# Patient Record
Sex: MALE | Race: WHITE | NOT HISPANIC OR LATINO | Employment: FULL TIME | URBAN - METROPOLITAN AREA
[De-identification: names, ages, dates, MRNs, and addresses within clinical notes are randomized per-mention and may not be internally consistent; named-entity substitution may affect disease eponyms.]

---

## 2023-02-18 ENCOUNTER — APPOINTMENT (EMERGENCY)
Dept: RADIOLOGY | Facility: HOSPITAL | Age: 48
End: 2023-02-18

## 2023-02-18 ENCOUNTER — APPOINTMENT (EMERGENCY)
Dept: CT IMAGING | Facility: HOSPITAL | Age: 48
End: 2023-02-18

## 2023-02-18 ENCOUNTER — HOSPITAL ENCOUNTER (INPATIENT)
Facility: HOSPITAL | Age: 48
LOS: 2 days | Discharge: HOME | End: 2023-02-20
Attending: EMERGENCY MEDICINE | Admitting: INTERNAL MEDICINE

## 2023-02-18 DIAGNOSIS — T40.411A ACCIDENTAL FENTANYL OVERDOSE, INITIAL ENCOUNTER (HCC): Primary | ICD-10-CM

## 2023-02-18 DIAGNOSIS — S82.892A CLOSED FRACTURE DISLOCATION OF LEFT ANKLE, INITIAL ENCOUNTER: ICD-10-CM

## 2023-02-18 DIAGNOSIS — S93.05XA DISLOCATION OF LEFT ANKLE JOINT, INITIAL ENCOUNTER: ICD-10-CM

## 2023-02-18 DIAGNOSIS — S82.445G: ICD-10-CM

## 2023-02-18 PROBLEM — G47.33 OSA (OBSTRUCTIVE SLEEP APNEA): Status: ACTIVE | Noted: 2023-02-18

## 2023-02-18 PROBLEM — T50.901A ACCIDENTAL OVERDOSE: Status: ACTIVE | Noted: 2023-02-18

## 2023-02-18 PROBLEM — S82.302A CLOSED FRACTURE OF DISTAL END OF LEFT TIBIA: Status: ACTIVE | Noted: 2023-02-18

## 2023-02-18 PROBLEM — F11.90 OPIOID USE DISORDER: Status: ACTIVE | Noted: 2023-02-18

## 2023-02-18 LAB
ANION GAP SERPL CALCULATED.3IONS-SCNC: 10 MMOL/L (ref 5–14)
BASOPHILS # BLD AUTO: 0.09 THOUSANDS/ÂΜL (ref 0–0.1)
BASOPHILS NFR BLD AUTO: 1 % (ref 0–1)
BUN SERPL-MCNC: 15 MG/DL (ref 5–25)
CALCIUM SERPL-MCNC: 8.8 MG/DL (ref 8.4–10.2)
CHLORIDE SERPL-SCNC: 104 MMOL/L (ref 96–108)
CO2 SERPL-SCNC: 23 MMOL/L (ref 21–32)
CREAT SERPL-MCNC: 0.8 MG/DL (ref 0.7–1.5)
EOSINOPHIL # BLD AUTO: 0.27 THOUSAND/ÂΜL (ref 0–0.61)
EOSINOPHIL NFR BLD AUTO: 2 % (ref 0–6)
ERYTHROCYTE [DISTWIDTH] IN BLOOD BY AUTOMATED COUNT: 12.4 % (ref 11.6–15.1)
ETHANOL EXG-MCNC: 0 MG/DL
GFR SERPL CREATININE-BSD FRML MDRD: 105 ML/MIN/1.73SQ M
GLUCOSE SERPL-MCNC: 190 MG/DL (ref 70–99)
HCT VFR BLD AUTO: 44.9 % (ref 36.5–49.3)
HGB BLD-MCNC: 15.2 G/DL (ref 12–17)
IMM GRANULOCYTES # BLD AUTO: 0.12 THOUSAND/UL (ref 0–0.2)
IMM GRANULOCYTES NFR BLD AUTO: 1 % (ref 0–2)
LYMPHOCYTES # BLD AUTO: 2.87 THOUSANDS/ÂΜL (ref 0.6–4.47)
LYMPHOCYTES NFR BLD AUTO: 23 % (ref 14–44)
MCH RBC QN AUTO: 31 PG (ref 26.8–34.3)
MCHC RBC AUTO-ENTMCNC: 33.9 G/DL (ref 31.4–37.4)
MCV RBC AUTO: 91 FL (ref 82–98)
MONOCYTES # BLD AUTO: 0.84 THOUSAND/ÂΜL (ref 0.17–1.22)
MONOCYTES NFR BLD AUTO: 7 % (ref 4–12)
NEUTROPHILS # BLD AUTO: 8.09 THOUSANDS/ÂΜL (ref 1.85–7.62)
NEUTS SEG NFR BLD AUTO: 66 % (ref 43–75)
NRBC BLD AUTO-RTO: 0 /100 WBCS
PLATELET # BLD AUTO: 216 THOUSANDS/UL (ref 149–390)
PMV BLD AUTO: 9.5 FL (ref 8.9–12.7)
POTASSIUM SERPL-SCNC: 3.9 MMOL/L (ref 3.5–5.3)
RBC # BLD AUTO: 4.91 MILLION/UL (ref 3.88–5.62)
SODIUM SERPL-SCNC: 137 MMOL/L (ref 135–147)
WBC # BLD AUTO: 12.28 THOUSAND/UL (ref 4.31–10.16)

## 2023-02-18 PROCEDURE — 2W3MX1Z IMMOBILIZATION OF LEFT LOWER EXTREMITY USING SPLINT: ICD-10-PCS | Performed by: EMERGENCY MEDICINE

## 2023-02-18 RX ORDER — ACETAMINOPHEN 325 MG/1
650 TABLET ORAL ONCE
Status: CANCELLED | OUTPATIENT
Start: 2023-02-18 | End: 2023-02-18

## 2023-02-18 RX ORDER — ONDANSETRON 2 MG/ML
INJECTION INTRAMUSCULAR; INTRAVENOUS
Status: COMPLETED
Start: 2023-02-18 | End: 2023-02-18

## 2023-02-18 RX ORDER — NALOXONE HYDROCHLORIDE 1 MG/ML
INJECTION INTRAMUSCULAR; INTRAVENOUS; SUBCUTANEOUS
Status: COMPLETED
Start: 2023-02-18 | End: 2023-02-18

## 2023-02-18 RX ORDER — ONDANSETRON 2 MG/ML
1 INJECTION INTRAMUSCULAR; INTRAVENOUS ONCE
Status: COMPLETED | OUTPATIENT
Start: 2023-02-18 | End: 2023-02-18

## 2023-02-18 RX ORDER — NALOXONE HYDROCHLORIDE 1 MG/ML
1 INJECTION PARENTERAL ONCE
Status: COMPLETED | OUTPATIENT
Start: 2023-02-18 | End: 2023-02-18

## 2023-02-18 RX ADMIN — TETANUS TOXOID, REDUCED DIPHTHERIA TOXOID AND ACELLULAR PERTUSSIS VACCINE, ADSORBED 0.5 ML: 5; 2.5; 8; 8; 2.5 SUSPENSION INTRAMUSCULAR at 23:41

## 2023-02-19 ENCOUNTER — APPOINTMENT (INPATIENT)
Dept: RADIOLOGY | Facility: HOSPITAL | Age: 48
End: 2023-02-19

## 2023-02-19 PROBLEM — G47.33 OSA (OBSTRUCTIVE SLEEP APNEA): Status: RESOLVED | Noted: 2023-02-18 | Resolved: 2023-02-19

## 2023-02-19 LAB
ANION GAP SERPL CALCULATED.3IONS-SCNC: 9 MMOL/L (ref 5–14)
ATRIAL RATE: 70 BPM
BUN SERPL-MCNC: 20 MG/DL (ref 5–25)
CALCIUM SERPL-MCNC: 9.1 MG/DL (ref 8.4–10.2)
CHLORIDE SERPL-SCNC: 103 MMOL/L (ref 96–108)
CO2 SERPL-SCNC: 26 MMOL/L (ref 21–32)
CREAT SERPL-MCNC: 1.56 MG/DL (ref 0.7–1.5)
ERYTHROCYTE [DISTWIDTH] IN BLOOD BY AUTOMATED COUNT: 12.7 % (ref 11.6–15.1)
GFR SERPL CREATININE-BSD FRML MDRD: 51 ML/MIN/1.73SQ M
GLUCOSE SERPL-MCNC: 115 MG/DL (ref 70–99)
HCT VFR BLD AUTO: 46.5 % (ref 36.5–49.3)
HGB BLD-MCNC: 15.5 G/DL (ref 12–17)
MCH RBC QN AUTO: 31.1 PG (ref 26.8–34.3)
MCHC RBC AUTO-ENTMCNC: 33.3 G/DL (ref 31.4–37.4)
MCV RBC AUTO: 93 FL (ref 82–98)
P AXIS: 42 DEGREES
PLATELET # BLD AUTO: 229 THOUSANDS/UL (ref 149–390)
PMV BLD AUTO: 10.3 FL (ref 8.9–12.7)
POTASSIUM SERPL-SCNC: 5.6 MMOL/L (ref 3.5–5.3)
PR INTERVAL: 150 MS
QRS AXIS: 11 DEGREES
QRSD INTERVAL: 102 MS
QT INTERVAL: 426 MS
QTC INTERVAL: 460 MS
RBC # BLD AUTO: 4.98 MILLION/UL (ref 3.88–5.62)
SODIUM SERPL-SCNC: 138 MMOL/L (ref 135–147)
T WAVE AXIS: 16 DEGREES
VENTRICULAR RATE: 70 BPM
WBC # BLD AUTO: 15.63 THOUSAND/UL (ref 4.31–10.16)

## 2023-02-19 RX ORDER — ONDANSETRON 2 MG/ML
4 INJECTION INTRAMUSCULAR; INTRAVENOUS EVERY 6 HOURS PRN
Status: DISCONTINUED | OUTPATIENT
Start: 2023-02-18 | End: 2023-02-20 | Stop reason: HOSPADM

## 2023-02-19 RX ORDER — OXYCODONE HYDROCHLORIDE 5 MG/1
10 TABLET ORAL EVERY 4 HOURS PRN
Status: DISCONTINUED | OUTPATIENT
Start: 2023-02-19 | End: 2023-02-20 | Stop reason: HOSPADM

## 2023-02-19 RX ORDER — HEPARIN SODIUM 5000 [USP'U]/ML
5000 INJECTION, SOLUTION INTRAVENOUS; SUBCUTANEOUS EVERY 8 HOURS SCHEDULED
Status: COMPLETED | OUTPATIENT
Start: 2023-02-19 | End: 2023-02-19

## 2023-02-19 RX ORDER — NICOTINE 21 MG/24HR
1 PATCH, TRANSDERMAL 24 HOURS TRANSDERMAL DAILY
Status: DISCONTINUED | OUTPATIENT
Start: 2023-02-19 | End: 2023-02-20 | Stop reason: HOSPADM

## 2023-02-19 RX ORDER — ACETAMINOPHEN 325 MG/1
650 TABLET ORAL EVERY 4 HOURS PRN
Status: DISCONTINUED | OUTPATIENT
Start: 2023-02-18 | End: 2023-02-20 | Stop reason: HOSPADM

## 2023-02-19 RX ORDER — DOCUSATE SODIUM 100 MG/1
100 CAPSULE, LIQUID FILLED ORAL 2 TIMES DAILY
Status: DISCONTINUED | OUTPATIENT
Start: 2023-02-19 | End: 2023-02-20 | Stop reason: HOSPADM

## 2023-02-19 RX ORDER — OXYCODONE HYDROCHLORIDE 5 MG/1
5 TABLET ORAL EVERY 4 HOURS PRN
Status: DISCONTINUED | OUTPATIENT
Start: 2023-02-19 | End: 2023-02-20 | Stop reason: HOSPADM

## 2023-02-19 RX ORDER — ENOXAPARIN SODIUM 100 MG/ML
40 INJECTION SUBCUTANEOUS
Status: DISCONTINUED | OUTPATIENT
Start: 2023-02-20 | End: 2023-02-20 | Stop reason: HOSPADM

## 2023-02-19 RX ADMIN — DOCUSATE SODIUM 100 MG: 100 CAPSULE, LIQUID FILLED ORAL at 17:01

## 2023-02-19 RX ADMIN — HEPARIN SODIUM 5000 UNITS: 5000 INJECTION INTRAVENOUS; SUBCUTANEOUS at 01:17

## 2023-02-19 RX ADMIN — DOCUSATE SODIUM 100 MG: 100 CAPSULE, LIQUID FILLED ORAL at 08:19

## 2023-02-19 RX ADMIN — OXYCODONE HYDROCHLORIDE 10 MG: 5 TABLET ORAL at 14:48

## 2023-02-19 RX ADMIN — NICOTINE 1 PATCH: 21 PATCH, EXTENDED RELEASE TRANSDERMAL at 08:19

## 2023-02-19 RX ADMIN — OXYCODONE HYDROCHLORIDE 10 MG: 5 TABLET ORAL at 22:00

## 2023-02-19 RX ADMIN — ONDANSETRON 4 MG: 2 INJECTION INTRAMUSCULAR; INTRAVENOUS at 08:23

## 2023-02-19 RX ADMIN — OXYCODONE HYDROCHLORIDE 10 MG: 5 TABLET ORAL at 01:16

## 2023-02-19 NOTE — PLAN OF CARE
Problem: Potential for Falls  Goal: Patient will remain free of falls  Description: INTERVENTIONS:  - Educate patient/family on patient safety including physical limitations  - Instruct patient to call for assistance with activity   - Consult OT/PT to assist with strengthening/mobility   - Keep Call bell within reach  - Keep bed low and locked with side rails adjusted as appropriate  - Keep care items and personal belongings within reach  - Initiate and maintain comfort rounds  - Make Fall Risk Sign visible to staff    - Apply yellow socks and bracelet for high fall risk patients  - Consider moving patient to room near nurses station  Outcome: Progressing     Problem: MOBILITY - ADULT  Goal: Maintain or return to baseline ADL function  Description: INTERVENTIONS:  -  Assess patient's ability to carry out ADLs; assess patient's baseline for ADL function and identify physical deficits which impact ability to perform ADLs (bathing, care of mouth/teeth, toileting, grooming, dressing, etc )  - Assess/evaluate cause of self-care deficits   - Assess range of motion  - Assess patient's mobility; develop plan if impaired  - Assess patient's need for assistive devices and provide as appropriate  - Encourage maximum independence but intervene and supervise when necessary  - Involve family in performance of ADLs  - Assess for home care needs following discharge   - Consider OT consult to assist with ADL evaluation and planning for discharge  - Provide patient education as appropriate  Outcome: Progressing  Goal: Maintains/Returns to pre admission functional level  Description: INTERVENTIONS:  - Perform BMAT or MOVE assessment daily    - Set and communicate daily mobility goal to care team and patient/family/caregiver     - Collaborate with rehabilitation services on mobility goals if consulted    - Out of bed for toileting  - Record patient progress and toleration of activity level   Outcome: Progressing     Problem: PAIN - ADULT  Goal: Verbalizes/displays adequate comfort level or baseline comfort level  Description: Interventions:  - Encourage patient to monitor pain and request assistance  - Assess pain using appropriate pain scale  - Administer analgesics based on type and severity of pain and evaluate response  - Implement non-pharmacological measures as appropriate and evaluate response  - Consider cultural and social influences on pain and pain management  - Notify physician/advanced practitioner if interventions unsuccessful or patient reports new pain  Outcome: Progressing     Problem: INFECTION - ADULT  Goal: Absence or prevention of progression during hospitalization  Description: INTERVENTIONS:  - Assess and monitor for signs and symptoms of infection  - Monitor lab/diagnostic results  - Monitor all insertion sites, i e  indwelling lines, tubes, and drains  - Monitor endotracheal if appropriate and nasal secretions for changes in amount and color  - [place] appropriate cooling/warming therapies per order  - Administer medications as ordered  - Instruct and encourage patient and family to use good hand hygiene technique  - Identify and instruct in appropriate isolation precautions for identified infection/condition  Outcome: Progressing  Goal: Absence of fever/infection during neutropenic period  Description: INTERVENTIONS:  - Monitor WBC    Outcome: Progressing     Problem: SAFETY ADULT  Goal: Patient will remain free of falls  Description: INTERVENTIONS:  - Educate patient/family on patient safety including physical limitations  - Instruct patient to call for assistance with activity   - Consult OT/PT to assist with strengthening/mobility   - Keep Call bell within reach  - Keep bed low and locked with side rails adjusted as appropriate  - Keep care items and personal belongings within reach  - Initiate and maintain comfort rounds  - Make Fall Risk Sign visible to staff    - Apply yellow socks and bracelet for high fall risk patients  - Consider moving patient to room near nurses station  Outcome: Progressing  Goal: Maintain or return to baseline ADL function  Description: INTERVENTIONS:  -  Assess patient's ability to carry out ADLs; assess patient's baseline for ADL function and identify physical deficits which impact ability to perform ADLs (bathing, care of mouth/teeth, toileting, grooming, dressing, etc )  - Assess/evaluate cause of self-care deficits   - Assess range of motion  - Assess patient's mobility; develop plan if impaired  - Assess patient's need for assistive devices and provide as appropriate  - Encourage maximum independence but intervene and supervise when necessary  - Involve family in performance of ADLs  - Assess for home care needs following discharge   - Consider OT consult to assist with ADL evaluation and planning for discharge  - Provide patient education as appropriate  Outcome: Progressing  Goal: Maintains/Returns to pre admission functional level  Description: INTERVENTIONS:  - Perform BMAT or MOVE assessment daily    - Set and communicate daily mobility goal to care team and patient/family/caregiver  - Collaborate with rehabilitation services on mobility goals if consulted    - Out of bed for toileting  - Record patient progress and toleration of activity level   Outcome: Progressing     Problem: Knowledge Deficit  Goal: Patient/family/caregiver demonstrates understanding of disease process, treatment plan, medications, and discharge instructions  Description: Complete learning assessment and assess knowledge base    Interventions:  - Provide teaching at level of understanding  - Provide teaching via preferred learning methods  Outcome: Progressing     Problem: Prexisting or High Potential for Compromised Skin Integrity  Goal: Skin integrity is maintained or improved  Description: INTERVENTIONS:  - Identify patients at risk for skin breakdown  - Assess and monitor skin integrity  - Assess and monitor nutrition and hydration status  - Monitor labs   - Assess for incontinence   - Turn and reposition patient  - Assist with mobility/ambulation  - Relieve pressure over bony prominences  - Avoid friction and shearing  - Provide appropriate hygiene as needed including keeping skin clean and dry  - Evaluate need for skin moisturizer/barrier cream  - Collaborate with interdisciplinary team   - Patient/family teaching  - Consider wound care consult   Outcome: Progressing

## 2023-02-19 NOTE — CONSULTS
I have reviewed all xrays, both pre and post reduction  Pt with adequate positioning for outpt surgery  Non-emergent at this time  Pt should be NWB  Will require soft tissue envelope to calm down before surgical intervention  I d/w Dr Angie Aiken in great detail  Please contact me as needed  We will see pt outpt

## 2023-02-19 NOTE — PLAN OF CARE
Problem: Potential for Falls  Goal: Patient will remain free of falls  Description: INTERVENTIONS:  - Educate patient/family on patient safety including physical limitations  - Instruct patient to call for assistance with activity   - Consult OT/PT to assist with strengthening/mobility   - Keep Call bell within reach  - Keep bed low and locked with side rails adjusted as appropriate  - Keep care items and personal belongings within reach  - Initiate and maintain comfort rounds  - Make Fall Risk Sign visible to staff  - Apply yellow socks and bracelet for high fall risk patients  - Consider moving patient to room near nurses station  Outcome: Progressing     Problem: MOBILITY - ADULT  Goal: Maintain or return to baseline ADL function  Description: INTERVENTIONS:  -  Assess patient's ability to carry out ADLs; assess patient's baseline for ADL function and identify physical deficits which impact ability to perform ADLs (bathing, care of mouth/teeth, toileting, grooming, dressing, etc )  - Assess/evaluate cause of self-care deficits   - Assess range of motion  - Assess patient's mobility; develop plan if impaired  - Assess patient's need for assistive devices and provide as appropriate  - Encourage maximum independence but intervene and supervise when necessary  - Involve family in performance of ADLs  - Assess for home care needs following discharge   - Consider OT consult to assist with ADL evaluation and planning for discharge  - Provide patient education as appropriate  Outcome: Progressing  Goal: Maintains/Returns to pre admission functional level  Description: INTERVENTIONS:  - Perform BMAT or MOVE assessment daily    - Set and communicate daily mobility goal to care team and patient/family/caregiver     - Collaborate with rehabilitation services on mobility goals if consulted  - Out of bed for toileting  - Record patient progress and toleration of activity level   Outcome: Progressing     Problem: PAIN - ADULT  Goal: Verbalizes/displays adequate comfort level or baseline comfort level  Description: Interventions:  - Encourage patient to monitor pain and request assistance  - Assess pain using appropriate pain scale  - Administer analgesics based on type and severity of pain and evaluate response  - Implement non-pharmacological measures as appropriate and evaluate response  - Consider cultural and social influences on pain and pain management  - Notify physician/advanced practitioner if interventions unsuccessful or patient reports new pain  Outcome: Progressing     Problem: INFECTION - ADULT  Goal: Absence or prevention of progression during hospitalization  Description: INTERVENTIONS:  - Assess and monitor for signs and symptoms of infection  - Monitor lab/diagnostic results  - Monitor all insertion sites, i e  indwelling lines, tubes, and drains  - Monitor endotracheal if appropriate and nasal secretions for changes in amount and color  - [place] appropriate cooling/warming therapies per order  - Administer medications as ordered  - Instruct and encourage patient and family to use good hand hygiene technique  - Identify and instruct in appropriate isolation precautions for identified infection/condition  Outcome: Progressing  Goal: Absence of fever/infection during neutropenic period  Description: INTERVENTIONS:  - Monitor WBC    Outcome: Progressing     Problem: SAFETY ADULT  Goal: Patient will remain free of falls  Description: INTERVENTIONS:  - Educate patient/family on patient safety including physical limitations  - Instruct patient to call for assistance with activity   - Consult OT/PT to assist with strengthening/mobility   - Keep Call bell within reach  - Keep bed low and locked with side rails adjusted as appropriate  - Keep care items and personal belongings within reach  - Initiate and maintain comfort rounds  - Make Fall Risk Sign visible to staff  - Apply yellow socks and bracelet for high fall risk patients  - Consider moving patient to room near nurses station  Outcome: Progressing  Goal: Maintain or return to baseline ADL function  Description: INTERVENTIONS:  -  Assess patient's ability to carry out ADLs; assess patient's baseline for ADL function and identify physical deficits which impact ability to perform ADLs (bathing, care of mouth/teeth, toileting, grooming, dressing, etc )  - Assess/evaluate cause of self-care deficits   - Assess range of motion  - Assess patient's mobility; develop plan if impaired  - Assess patient's need for assistive devices and provide as appropriate  - Encourage maximum independence but intervene and supervise when necessary  - Involve family in performance of ADLs  - Assess for home care needs following discharge   - Consider OT consult to assist with ADL evaluation and planning for discharge  - Provide patient education as appropriate  Outcome: Progressing  Goal: Maintains/Returns to pre admission functional level  Description: INTERVENTIONS:  - Perform BMAT or MOVE assessment daily    - Set and communicate daily mobility goal to care team and patient/family/caregiver     - Collaborate with rehabilitation services on mobility goals if consulted  - Out of bed for toileting  - Record patient progress and toleration of activity level   Outcome: Progressing     Problem: DISCHARGE PLANNING  Goal: Discharge to home or other facility with appropriate resources  Description: INTERVENTIONS:  - Identify barriers to discharge w/patient and caregiver  - Arrange for needed discharge resources and transportation as appropriate  - Identify discharge learning needs (meds, wound care, etc )  - Arrange for interpretive services to assist at discharge as needed  - Refer to Case Management Department for coordinating discharge planning if the patient needs post-hospital services based on physician/advanced practitioner order or complex needs related to functional status, cognitive ability, or social support system  Outcome: Progressing     Problem: Knowledge Deficit  Goal: Patient/family/caregiver demonstrates understanding of disease process, treatment plan, medications, and discharge instructions  Description: Complete learning assessment and assess knowledge base    Interventions:  - Provide teaching at level of understanding  - Provide teaching via preferred learning methods  Outcome: Progressing

## 2023-02-19 NOTE — ASSESSMENT & PLAN NOTE
Patient presented with a fall outside a bar at his apartment, He reports he has been tapering down over the past 18 months and this past week he noted increased pain/withdrawal and snorted fentanyl earlier today  EMS was called and IM and IV Narcan given  · Monitor patient  · Resume in AM or 1 dose until can be reviewed on Monday  · He follows with methadone clinic, [facility] in [place], [place]  [phone]   They are closed on Sundays and back 5:30-10:30 am Monday to Friday

## 2023-02-19 NOTE — ASSESSMENT & PLAN NOTE
· Had witnessed fall outside of bar at his apartment and presented to the ED w/ obvious ankle deformity  · Podiatry consult  · Pain control  · If patient does not have a procedure with podiatry, will need diet ordered and resume DVT prophylaxis    1 dose of heparin was ordered for overnight awaiting their input

## 2023-02-19 NOTE — ED PROVIDER NOTES
History  Chief Complaint   Patient presents with   • Overdose - Accidental     Pt presents  to the ED via EMS after an overdose  Pt was in a bar and was a witnessed fall  Pt states that he snorted something today that he thinks he took fentanyl  Pt given 2mg narcan IN and 4mg zofran IV  States that he remembers going into the bar but nothing afterwards  Obvious deformity noted to L ankle  79-year-old male with past medical history of opioid use disorder, SUSAN presents to emergency department via EMS for accidental overdose  Patient states that he snorted fentanyl earlier today  The last thing that he remembers was going down to get a garbage bag  States that  been on methadone for 18 months  States the [facility] clinic in [facility]  States that this is the first time he used opioids in the past 18 months  As noted  Declines HOST services saying that the already has established care  Denies SI/HI  Obvious deformity to left ankle on arrival  Swelling noted  Patient denies remembering injuring it  Does not remember falling  Is complaining of generalized discomfort to left lower leg  Denies numbness, denies weakness, denies tingling  History provided by:  Patient and EMS personnel  Overdose - Accidental  Ingested substance:  Illicit drugs  Incident location: per EMS he was picked up from a bar, had a witnessed fall   Context: recreational    Associated symptoms: no abdominal pain, no agitation, no altered mental status, no chest pain, no cough, no diaphoresis, no headaches, no lethargy, no nausea, no shortness of breath, no slurred speech, no unresponsiveness and no vomiting        None       Past Medical History:   Diagnosis Date   • Opiate abuse, continuous ([facility] [address] )        History reviewed  No pertinent surgical history  History reviewed  No pertinent family history  I have reviewed and agree with the history as documented      E-Cigarette/Vaping     E-Cigarette/Vaping Substances     Social History Tobacco Use   • Smoking status: Every Day     Packs/day: 1 00     Types: Cigarettes   • Smokeless tobacco: Never   Substance Use Topics   • Drug use: Yes     Types: Fentanyl       Review of Systems   Constitutional: Negative for diaphoresis  Eyes: Negative for visual disturbance  Respiratory: Negative for cough and shortness of breath  Cardiovascular: Negative for chest pain  Gastrointestinal: Negative for abdominal pain, nausea and vomiting  Musculoskeletal: Positive for arthralgias, gait problem and joint swelling  Negative for back pain and neck pain  Skin: Negative for color change and rash  Neurological: Negative for dizziness, seizures, weakness, numbness and headaches  Psychiatric/Behavioral: Negative for agitation, confusion and suicidal ideas  The patient is not nervous/anxious  + memory loss    All other systems reviewed and are negative  Physical Exam  Physical Exam  Vitals and nursing note reviewed  Constitutional:       General: He is not in acute distress  Appearance: Normal appearance  He is not ill-appearing  HENT:      Head: Normocephalic and atraumatic  Mouth/Throat:      Mouth: Mucous membranes are moist       Pharynx: Oropharynx is clear  Eyes:      Extraocular Movements: Extraocular movements intact  Conjunctiva/sclera: Conjunctivae normal       Comments: Pinpoint    Cardiovascular:      Rate and Rhythm: Normal rate and regular rhythm  Pulses:           Dorsalis pedis pulses are detected w/ Doppler on the left side  Posterior tibial pulses are detected w/ Doppler on the left side  Pulmonary:      Effort: Pulmonary effort is normal  No tachypnea, bradypnea, accessory muscle usage, respiratory distress or retractions  Breath sounds: Normal breath sounds  Abdominal:      General: There is no distension  Palpations: Abdomen is soft  Tenderness: There is no abdominal tenderness     Musculoskeletal:      Cervical back: Normal range of motion  Left ankle: Swelling and deformity present  No ecchymosis or lacerations  Decreased range of motion  Left foot: Normal capillary refill  No tenderness, bony tenderness or crepitus  Feet:       Comments: No midline spinal tenderness, deformities, crepitus, step-off, skin changes noted  Skin:     General: Skin is warm and dry  Capillary Refill: Capillary refill takes less than 2 seconds  Findings: Abrasion present  No ecchymosis, erythema, laceration or rash  Neurological:      Mental Status: He is alert and oriented to person, place, and time  GCS: GCS eye subscore is 4  GCS verbal subscore is 5  GCS motor subscore is 6  Comments: B/L LE sensation intact            Vital Signs  ED Triage Vitals [02/18/23 2043]   Temperature Pulse Respirations Blood Pressure SpO2   97 7 °F (36 5 °C) 92 12 114/65 91 %      Temp Source Heart Rate Source Patient Position - Orthostatic VS BP Location FiO2 (%)   Oral Monitor Lying Left arm --      Pain Score       --           Vitals:    02/18/23 2043   BP: 114/65   Pulse: 92   Patient Position - Orthostatic VS: Lying         Visual Acuity      ED Medications  Medications   naloxone (FOR EMS ONLY) (NARCAN) 2 MG/2ML injection 2 mg (0 mg Does not apply Given to EMS 2/18/23 2046)   ondansetron (FOR EMS ONLY) (ZOFRAN) 4 mg/2 mL injection 4 mg (0 mg Does not apply Given to EMS 2/18/23 2052)   tetanus-diphtheria-acellular pertussis (BOOSTRIX) IM injection 0 5 mL (0 5 mL Intramuscular Given 2/18/23 2341)       Diagnostic Studies  Results Reviewed     Procedure Component Value Units Date/Time    Basic metabolic panel [[id number]]  (Abnormal) Collected: 02/18/23 2115    Lab Status: Final result Specimen: Blood from Hand, Left Updated: 02/18/23 2131     Sodium 137 mmol/L      Potassium 3 9 mmol/L      Chloride 104 mmol/L      CO2 23 mmol/L      ANION GAP 10 mmol/L      BUN 15 mg/dL      Creatinine 0 80 mg/dL      Glucose 190 mg/dL Calcium 8 8 mg/dL      eGFR 105 ml/min/1 73sq m     Narrative:      Hemolysis  National Kidney Disease Foundation guidelines for Chronic Kidney Disease (CKD):   •  Stage 1 with normal or high GFR (GFR > 90 mL/min/1 73 square meters)  •  Stage 2 Mild CKD (GFR = 60-89 mL/min/1 73 square meters)  •  Stage 3A Moderate CKD (GFR = 45-59 mL/min/1 73 square meters)  •  Stage 3B Moderate CKD (GFR = 30-44 mL/min/1 73 square meters)  •  Stage 4 Severe CKD (GFR = 15-29 mL/min/1 73 square meters)  •  Stage 5 End Stage CKD (GFR <15 mL/min/1 73 square meters)  Note: GFR calculation is accurate only with a steady state creatinine    CBC and differential [[id number]]  (Abnormal) Collected: 02/18/23 2115    Lab Status: Final result Specimen: Blood from Hand, Left Updated: 02/18/23 2121     WBC 12 28 Thousand/uL      RBC 4 91 Million/uL      Hemoglobin 15 2 g/dL      Hematocrit 44 9 %      MCV 91 fL      MCH 31 0 pg      MCHC 33 9 g/dL      RDW 12 4 %      MPV 9 5 fL      Platelets 214 Thousands/uL      nRBC 0 /100 WBCs      Neutrophils Relative 66 %      Immat GRANS % 1 %      Lymphocytes Relative 23 %      Monocytes Relative 7 %      Eosinophils Relative 2 %      Basophils Relative 1 %      Neutrophils Absolute 8 09 Thousands/µL      Immature Grans Absolute 0 12 Thousand/uL      Lymphocytes Absolute 2 87 Thousands/µL      Monocytes Absolute 0 84 Thousand/µL      Eosinophils Absolute 0 27 Thousand/µL      Basophils Absolute 0 09 Thousands/µL     POCT alcohol breath test [[id number]]  (Normal) Resulted: 02/18/23 2121    Lab Status: Final result Updated: 02/18/23 2121     EXTBreath Alcohol 0 0    Rapid drug screen, urine [[id number]]     Lab Status: No result Specimen: Urine                  XR ankle 3+ views LEFT   Final Result by Veronika Peacock MD (02/18 2307)      Fracture dislocation left ankle, as described above  Please see discussion  The images are available for clinical review        The study was marked in [facility] for immediate notification  Workstation performed: [id number]         CT head without contrast    (Results Pending)   XR ankle 3+ views LEFT    (Results Pending)              Procedures  Splint application    Date/Time: 2/18/2023 11:00 PM  Performed by: Sharon Collins PA-C  Authorized by: Sharon Collins PA-C   Universal Protocol:  Procedure performed by: (ED Reji Esqueda, RN Bibi Bhat )  Consent: Verbal consent obtained  Risks and benefits: risks, benefits and alternatives were discussed  Consent given by: patient  Patient identity confirmed: verbally with patient      Pre-procedure details:     Sensation:  Normal    Skin color:  Pink   Procedure details:     Laterality:  Left    Location:  Ankle    Ankle:  L ankle    Strapping: no      Splint type:  Sugar tong and short leg    Supplies:  Ortho-Glass and 3 layer wrap  Post-procedure details:     Pain:  Unchanged    Sensation:  Normal    Skin color:  Pink     Patient tolerance of procedure: Tolerated well, no immediate complications             ED Course  ED Course as of 02/18/23 2345   Sat Feb 18, 2023 2120 DP, PT detected by doppler  2+ R foot  Capillary refill equal bilaterally  Sensation intact  Moving toes without difficulty  2133 Procedure Note: EKG  Date/Time: 02/18/23 9:33 PM   Performed by: Ernesto Greer   Authorized by: Ernesto Greer  ECG interpreted by me, the ED Provider: yes   The EKG demonstrates:  Rate 70 bpm  Rhythm NSR  QTc 460 ms   No ST elevations/depressions     2155 DP still detected by Doppler    2313 XR ankle 3+ views LEFT     FINDINGS:     There is fracture dislocation of the left ankle  The distal aspect of the left tibia is partially displaced anteriorly relative to the talar dome  There is a fracture fragment immediately posterior to the distal left tibia suspicious for fracture of   the posterior malleolus  Additionally, there is an obliquely oriented spiral-type fracture of the distal shaft left fibula    There is soft tissue swelling and there is a small left ankle joint effusion      A tiny plantar calcaneal heel spur is present      No lytic or blastic osseous lesion      No radiopaque foreign bodies are demonstrable      IMPRESSION:     Fracture dislocation left ankle, as described above  Please see discussion      The images are available for clinical review  5 Discussed with Dr Rafael Dugan of Orthopedics, who recommended, reduction, splint, outpatient follow up, as surgery would not be for 7-10 days  If he does require admission for social reasons, recommended admission to medicine with Podiatry consult  Medical Decision Making  41-year-old male past medical history of opioid abuse presents to emergency department via EMS for acute overdose  Requiring IM and IV Narcan  Patient awake, alert, oriented on arrival   No respiratory depression  Pupils pinpoint  He reports snorting fentanyl  He states that he is on methadone  Declined host services  He is on continuous pulse ox  We will continue to monitor for signs of re-overdose  Also with history of obstructive sleep apnea  Obvious ankle deformity noted on arrival   EMS states that he had a witnessed fall from barstool  Patient has no memory of this  Dorsalis pedis pulse detected with Doppler  Capillary refill equal bilaterally  Foot is warm  Sensation is intact  Some range of motion  Will x-ray  Due to history of fall, no memory of the fall, fentanyl use, will CT head  No midline spinal tenderness or deformity  Abrasion of ankle cleansed  Tdap ordered  X-ray imaging showing acute fracture dislocation on wet read  Ankle suspected to have Self-reduced during Xray imaging  Patient subsequently splinted in a posterior and sugar-tong splint  Repeat imaging showing ankle has been reduced, fx of posterior medial malleolus, fibula fracture on wet read   discussed with Orthopedics who state surgery would not be for 7-10 days  however, patient has no social support, does not have anyone to pick him up or assist him, unable to steadily ambulate with crutches  so ortho recommended medical admission with Podiatry consult as they handle ankle fracture at [facility]  All imaging and/or lab testing discussed with patient  Patient and/or family members verbalizes understanding and agrees with plan for admission  Patient is stable for admission      Portions of the record may have been created with voice recognition software  Occasional wrong word or "sound a like" substitutions may have occurred due to the inherent limitations of voice recognition software  Read the chart carefully and recognize, using context, where substitutions have occurred  Accidental fentanyl overdose, initial encounter [facility]): acute illness or injury  Closed fracture dislocation of left ankle, initial encounter: acute illness or injury  Amount and/or Complexity of Data Reviewed  Labs: ordered  Radiology: ordered  Decision-making details documented in ED Course  Disposition  Final diagnoses:   [facility]   Closed fracture dislocation of left ankle, initial encounter     Time reflects when diagnosis was documented in both MDM as applicable and the Disposition within this note     Time User Action Codes Description Comment    2/18/2023 11:16 PM Tung Hines Add [T40 411A] Accidental fentanyl overdose, initial encounter ([facility] [address] )     2/18/2023 11:16 PM Tung Hines Add [W59 271P] Closed fracture dislocation of left ankle, initial encounter       ED Disposition     ED Disposition   Admit    Condition   Stable    Date/Time   Sat Feb 18, 2023 11:33 PM    Comment   Case was discussed with ELOISA and the patient's admission status was agreed to be Admission Status: inpatient status to the service of Dr Harmeet Rock              Follow-up Information    None         Patient's Medications    No medications on file       No discharge procedures on file      PDMP Review     None          ED Provider  Electronically Signed by           Robyn Winn PA-C  02/18/23 4696

## 2023-02-19 NOTE — QUICK NOTE
Dr Reji Breaux reviewed repeat X-ray findings (3rd L ankle XR) this morning  Findings are stable for outpatient surgery

## 2023-02-19 NOTE — ASSESSMENT & PLAN NOTE
Patient reports on methadone for 18 months   Reports from [address] [place] [address] in [place], [place]

## 2023-02-19 NOTE — ASSESSMENT & PLAN NOTE
ED reviewed w/ orthopedics and plan would be surgery in 1 week, however, patient home alone and not able to manage   Ortho recommended medical admission and podiatry consult

## 2023-02-19 NOTE — H&P
[facility]&P- Anu Mckenzie 1975, 49 y o  male MRN: [id number]  Unit/Bed#: 7T [facility] 708-01 Encounter: [id number]  Primary Care Provider: No primary care provider on file  Date and time admitted to hospital: 2/18/2023  8:43 PM    * Accidental overdose  Assessment & Plan  Patient presented with a fall outside a bar at his apartment, He reports he has been tapering down over the past 18 months and this past week he noted increased pain/withdrawal and snorted fentanyl earlier today  EMS was called and IM and IV Narcan given  · Monitor patient  · Resume in AM or 1 dose until can be reviewed on Monday  · He follows with methadone clinic, [facility] in [place], [place]  [phone]  They are closed on Sundays and back 5:30-10:30 am Monday to Friday    Dislocation of left ankle joint  Assessment & Plan  · Had witnessed fall outside of bar at his apartment and presented to the ED w/ obvious ankle deformity  · Podiatry consult  · Pain control  · If patient does not have a procedure with podiatry, will need diet ordered and resume DVT prophylaxis  1 dose of heparin was ordered for overnight awaiting their input    Closed fracture of distal end of left tibia  Assessment & Plan  ED reviewed w/ orthopedics and plan would be surgery in 1 week, however, patient home alone and not able to manage  Ortho recommended medical admission and podiatry consult    Closed nondisplaced spiral fracture of shaft of left fibula with delayed healing  Assessment & Plan  Podiatry consult  NWB    Opioid use disorder  Assessment & Plan  Patient reports on methadone for 18 months  Reports from [address] [place] [address] in [facility]    Patient Information: Anu Mckenzie 49 y o  male MRN: [id number]  Unit/Bed#: 7T [facility] 708-01 Encounter: [id number]  Admitting Physician: Dr Anitra Garcia  PCP: No primary care provider on file    Date of Admission: 02/19/23    REQUIRED DOCUMENTATION:     1  This service was provided via Telemedicine  2  Provider located at [place] [address]  3  TeleMed provider: Kae Barajas PA-C   4  Identify all parties in room with patient during tele consult:  Uri Martinez RN  5  After connecting through [employer], patient was identified by name and date of birth and assistant checked wristband  Patient was then informed that this was a Telemedicine visit and that the exam was being conducted confidentially over secure lines  My office door was closed  No one else was in the room  Patient acknowledged consent and understanding of privacy and security of the Telemedicine visit, and gave us permission to have the assistant stay in the room in order to assist with the history and to conduct the exam   I informed the patient that I have reviewed their record in Epic and presented the opportunity for them to ask any questions regarding the visit today  The patient agreed to participate  VTE Prophylaxis: will give 1 dose heparin overnight if any procedure with podiatry, scd right leg  Code Status: full code  Discussion with patient    Anticipated Length of Stay:  Patient will be admitted on an Inpatient basis with an anticipated length of stay of  > 2 midnights  Justification for Hospital Stay: specialist input, CM for d/c planning      Chief Complaint:   Accidental overdose with fall off barstool    History of Present Illness:    Missael Carlisle is a 49 y o  male who has PMHx of SUSAN, tobacco abuse, opioid disorder on methadone presents with accidental overdose with fall outside his apartment  He follows with methadone clinic, [facility] in [place], [place] currently on 5mg liquid daily  He reports he has been tapering down over the past 18 months and this past week he noted increased pain/withdrawal and snorted fentanyl  He does not remember events after   Believes he missed a 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  Pain currently 7/10 and reports dry mouth  Review of Systems:    Review of Systems   HENT:        Dry mouth   Musculoskeletal: Positive for arthralgias  All other systems reviewed and are negative  Past Medical and Surgical History:     Past Medical History:   Diagnosis Date   • Kidney stone    • Opiate abuse, continuous ([address] )    • SUSAN (obstructive sleep apnea)    • Subdural hematoma        Past Surgical History:   Procedure Laterality Date   • BRAIN HEMATOMA EVACUATION     • ORIF ELBOW FRACTURE Left        Meds/Allergies:    Prior to Admission medications    Not on File     all medications and allergies reviewed    Allergies: No Known Allergies    Social History:     Marital Status: Unknown   Occupation:   Patient Pre-hospital Living Situation: home  Patient Pre-hospital Level of Mobility: mobile  Patient Pre-hospital Diet Restrictions: none  Substance Use History:   Social History     Substance and Sexual Activity   Alcohol Use Not Currently     Social History     Tobacco Use   Smoking Status Every Day   • Packs/day: 1 00   • Types: Cigarettes   Smokeless Tobacco Never     Social History     Substance and Sexual Activity   Drug Use Yes   • Types: Fentanyl       Family History:  I have reviewed the patients family history     Physical Exam:     Vitals:   Blood Pressure: 99/72 (02/19/23 0028)  Pulse: 62 (02/19/23 0028)  Temperature: (!) 96 3 °F (35 7 °C) (02/19/23 0028)  Temp Source: Temporal (02/19/23 0028)  Respirations: 16 (02/19/23 0028)  Height: 5' 11" (180 3 cm) (02/19/23 0028)  Weight - Scale: 120 kg (263 lb 14 3 oz) (02/19/23 0028)  SpO2: 97 % (02/19/23 0028)    Physical Exam  Vitals reviewed  Constitutional:       General: He is not in acute distress  Appearance: Normal appearance  Interventions: Nasal cannula in place  Comments: [de-identified]  male   HENT:      Head: Normocephalic and atraumatic        Right Ear: External ear normal       Left Ear: External ear normal       Nose: Nose normal    Eyes:      General:         Right eye: No discharge  Left eye: No discharge  Conjunctiva/sclera: Conjunctivae normal    Neck:      Comments: Able to turn head side to side without difficulty  Cardiovascular:      Comments: Rate 62  Pulmonary:      Effort: Pulmonary effort is normal       Comments: Respiratory rate 16, 97% room air, speaking full sentences without difficulty  Abdominal:      Comments: No reported abdominal pain   Musculoskeletal:      Comments: Left lower extremity with splint in place  Patient was able to wiggle his toes and reports able to feel the nurse touch his foot and has capillary response   Neurological:      Mental Status: He is alert and oriented to person, place, and time  Mental status is at baseline  Psychiatric:         Mood and Affect: Affect is blunt  Speech: Speech normal          Behavior: Behavior is agitated  Thought Content: Thought content normal          Judgment: Judgment normal        Additional Data:     Lab Results: I have personally reviewed pertinent reports  Results from last 7 days   Lab Units 02/18/23  2115   WBC Thousand/uL 12 28*   HEMOGLOBIN g/dL 15 2   HEMATOCRIT % 44 9   PLATELETS Thousands/uL 216   NEUTROS PCT % 66   LYMPHS PCT % 23   MONOS PCT % 7   EOS PCT % 2     Results from last 7 days   Lab Units 02/18/23  2115   SODIUM mmol/L 137   POTASSIUM mmol/L 3 9   CHLORIDE mmol/L 104   CO2 mmol/L 23   BUN mg/dL 15   CREATININE mg/dL 0 80   ANION GAP mmol/L 10   CALCIUM mg/dL 8 8   GLUCOSE RANDOM mg/dL 190*         Imaging: xray my read: distal tibial fracture, left ankle dislocation    XR ankle 3+ views LEFT   Final Result by Alecia Platt MD (02/18 2307)      Fracture dislocation left ankle, as described above  Please see discussion  The images are available for clinical review  The study was marked in [facility] for immediate notification  Workstation performed: [id number]         CT head without contrast    (Results Pending)   XR ankle 3+ views LEFT    (Results Pending)       Epic / Care Everywhere Records Reviewed: Yes    ** Please Note: This note has been constructed using a voice recognition system   **

## 2023-02-20 ENCOUNTER — DOCUMENTATION (OUTPATIENT)
Dept: PSYCHIATRY | Facility: CLINIC | Age: 48
End: 2023-02-20

## 2023-02-20 VITALS
OXYGEN SATURATION: 94 % | DIASTOLIC BLOOD PRESSURE: 85 MMHG | TEMPERATURE: 97.8 F | HEART RATE: 75 BPM | BODY MASS INDEX: 36.94 KG/M2 | SYSTOLIC BLOOD PRESSURE: 151 MMHG | HEIGHT: 71 IN | RESPIRATION RATE: 16 BRPM | WEIGHT: 263.89 LBS

## 2023-02-20 LAB
AMPHETAMINES SERPL QL SCN: NEGATIVE
ANION GAP SERPL CALCULATED.3IONS-SCNC: 7 MMOL/L (ref 5–14)
BARBITURATES UR QL: NEGATIVE
BENZODIAZ UR QL: NEGATIVE
BUN SERPL-MCNC: 18 MG/DL (ref 5–25)
CALCIUM SERPL-MCNC: 9.1 MG/DL (ref 8.4–10.2)
CHLORIDE SERPL-SCNC: 98 MMOL/L (ref 96–108)
CO2 SERPL-SCNC: 30 MMOL/L (ref 21–32)
COCAINE UR QL: NEGATIVE
CREAT SERPL-MCNC: 0.81 MG/DL (ref 0.7–1.5)
ERYTHROCYTE [DISTWIDTH] IN BLOOD BY AUTOMATED COUNT: 12.4 % (ref 11.6–15.1)
GFR SERPL CREATININE-BSD FRML MDRD: 105 ML/MIN/1.73SQ M
GLUCOSE SERPL-MCNC: 119 MG/DL (ref 70–99)
HCT VFR BLD AUTO: 41.9 % (ref 36.5–49.3)
HGB BLD-MCNC: 13.9 G/DL (ref 12–17)
MCH RBC QN AUTO: 30.6 PG (ref 26.8–34.3)
MCHC RBC AUTO-ENTMCNC: 33.2 G/DL (ref 31.4–37.4)
MCV RBC AUTO: 92 FL (ref 82–98)
METHADONE UR QL: POSITIVE
OPIATES UR QL SCN: NEGATIVE
OXYCODONE+OXYMORPHONE UR QL SCN: POSITIVE
PCP UR QL: NEGATIVE
PLATELET # BLD AUTO: 200 THOUSANDS/UL (ref 149–390)
PMV BLD AUTO: 9.9 FL (ref 8.9–12.7)
POTASSIUM SERPL-SCNC: 3.9 MMOL/L (ref 3.5–5.3)
PROCALCITONIN SERPL-MCNC: 0.15 NG/ML
RBC # BLD AUTO: 4.54 MILLION/UL (ref 3.88–5.62)
SODIUM SERPL-SCNC: 135 MMOL/L (ref 135–147)
THC UR QL: POSITIVE
WBC # BLD AUTO: 10.63 THOUSAND/UL (ref 4.31–10.16)

## 2023-02-20 RX ORDER — NICOTINE 21 MG/24HR
1 PATCH, TRANSDERMAL 24 HOURS TRANSDERMAL DAILY
Qty: 28 PATCH | Refills: 0 | Status: SHIPPED | OUTPATIENT
Start: 2023-02-21

## 2023-02-20 RX ORDER — OXYCODONE HYDROCHLORIDE 5 MG/1
5 TABLET ORAL EVERY 4 HOURS PRN
Qty: 5 TABLET | Refills: 0 | Status: SHIPPED | OUTPATIENT
Start: 2023-02-20 | End: 2023-02-21

## 2023-02-20 RX ORDER — CALCIUM CARBONATE 200(500)MG
500 TABLET,CHEWABLE ORAL DAILY PRN
Status: DISCONTINUED | OUTPATIENT
Start: 2023-02-20 | End: 2023-02-20 | Stop reason: HOSPADM

## 2023-02-20 RX ORDER — NALOXONE HYDROCHLORIDE 4 MG/.1ML
SPRAY NASAL
Qty: 1 EACH | Refills: 0 | Status: SHIPPED | OUTPATIENT
Start: 2023-02-20 | End: 2023-02-21

## 2023-02-20 RX ADMIN — NICOTINE 1 PATCH: 21 PATCH, EXTENDED RELEASE TRANSDERMAL at 08:51

## 2023-02-20 RX ADMIN — ACETAMINOPHEN 650 MG: 325 TABLET ORAL at 08:53

## 2023-02-20 RX ADMIN — ENOXAPARIN SODIUM 40 MG: 40 INJECTION SUBCUTANEOUS at 08:50

## 2023-02-20 RX ADMIN — CALCIUM CARBONATE (ANTACID) CHEW TAB 500 MG 500 MG: 500 CHEW TAB at 05:39

## 2023-02-20 RX ADMIN — OXYCODONE HYDROCHLORIDE 10 MG: 5 TABLET ORAL at 08:53

## 2023-02-20 RX ADMIN — DOCUSATE SODIUM 100 MG: 100 CAPSULE, LIQUID FILLED ORAL at 08:51

## 2023-02-20 NOTE — ASSESSMENT & PLAN NOTE
Patient presented with a fall outside a bar at his apartment, He reports he has been tapering down over the past 18 months and this past week he noted increased pain/withdrawal and snorted fentanyl earlier today    EMS was called and IM and IV Narcan given  · Monitor patient  · Medically stable for discharge

## 2023-02-20 NOTE — ASSESSMENT & PLAN NOTE
Patient reports on methadone for 18 months   Reports from [address] [place] [address] in [place], [place] None known

## 2023-02-20 NOTE — PLAN OF CARE
Problem: Potential for Falls  Goal: Patient will remain free of falls  Description: INTERVENTIONS:  - Educate patient/family on patient safety including physical limitations  - Instruct patient to call for assistance with activity   - Consult OT/PT to assist with strengthening/mobility   - Keep Call bell within reach  - Keep bed low and locked with side rails adjusted as appropriate  - Keep care items and personal belongings within reach  - Initiate and maintain comfort rounds  - Make Fall Risk Sign visible to staff  - Offer Toileting every    Hours, in advance of need  - Initiate/Maintain   alarm  - Obtain necessary fall risk management equipment:     - Apply yellow socks and bracelet for high fall risk patients  - Consider moving patient to room near nurses station  Outcome: Progressing     Problem: MOBILITY - ADULT  Goal: Maintain or return to baseline ADL function  Description: INTERVENTIONS:  -  Assess patient's ability to carry out ADLs; assess patient's baseline for ADL function and identify physical deficits which impact ability to perform ADLs (bathing, care of mouth/teeth, toileting, grooming, dressing, etc )  - Assess/evaluate cause of self-care deficits   - Assess range of motion  - Assess patient's mobility; develop plan if impaired  - Assess patient's need for assistive devices and provide as appropriate  - Encourage maximum independence but intervene and supervise when necessary  - Involve family in performance of ADLs  - Assess for home care needs following discharge   - Consider OT consult to assist with ADL evaluation and planning for discharge  - Provide patient education as appropriate  Outcome: Progressing     Problem: PAIN - ADULT  Goal: Verbalizes/displays adequate comfort level or baseline comfort level  Description: Interventions:  - Encourage patient to monitor pain and request assistance  - Assess pain using appropriate pain scale  - Administer analgesics based on type and severity of pain and evaluate response  - Implement non-pharmacological measures as appropriate and evaluate response  - Consider cultural and social influences on pain and pain management  - Notify physician/advanced practitioner if interventions unsuccessful or patient reports new pain  Outcome: Progressing     Problem: DISCHARGE PLANNING  Goal: Discharge to home or other facility with appropriate resources  Description: INTERVENTIONS:  - Identify barriers to discharge w/patient and caregiver  - Arrange for needed discharge resources and transportation as appropriate  - Identify discharge learning needs (meds, wound care, etc )  - Arrange for interpretive services to assist at discharge as needed  - Refer to Case Management Department for coordinating discharge planning if the patient needs post-hospital services based on physician/advanced practitioner order or complex needs related to functional status, cognitive ability, or social support system  Outcome: Progressing     Problem: Knowledge Deficit  Goal: Patient/family/caregiver demonstrates understanding of disease process, treatment plan, medications, and discharge instructions  Description: Complete learning assessment and assess knowledge base    Interventions:  - Provide teaching at level of understanding  - Provide teaching via preferred learning methods  Outcome: Progressing

## 2023-02-20 NOTE — DISCHARGE SUMMARY
[facility]  Discharge- Jacque Walker 1975, 49 y o  male MRN: [id number]  Unit/Bed#: 7T [facility] 708-01 Encounter: [id number]  Primary Care Provider: No primary care provider on file  Date and time admitted to hospital: 2/18/2023  8:43 PM    * Closed fracture of distal end of left tibia  Assessment & Plan  ED reviewed w/ orthopedics and plan would be surgery in 1 week, however, patient home alone and not able to manage  Ortho recommended medical admission and podiatry consult    Podiatry evaluated patient on 2/19, recommend outpatient follow-up with podiatry with potential surgery in 1 week or so  PT/OT recommending home with walker  Patient will be discharged home    Closed nondisplaced spiral fracture of shaft of left fibula with delayed healing  Assessment & Plan  Podiatry consulted  NWB    Will need surgery on outpatient basis in 1-2 weeks per podiatry, will follow with podiatry on outpatient basis  Patient given ambulatory referral and phone number of podiatry clinic on discharge    Opioid use disorder  Assessment & Plan  Patient reports on methadone for 18 months  Reports from [address] [place] [address] in [place], [place]    Accidental overdose  Assessment & Plan  Patient presented with a fall outside a bar at his apartment, He reports he has been tapering down over the past 18 months and this past week he noted increased pain/withdrawal and snorted fentanyl earlier today  EMS was called and IM and IV Narcan given  · Monitor patient  · Medically stable for discharge        Medical Problems     Resolved Problems  Date Reviewed: 2/20/2023          Resolved    SUSAN (obstructive sleep apnea) 2/19/2023     Resolved by  Telma Chris PA-C        Discharging Physician / Practitioner: Nilsa Aschoff, DO  PCP: No primary care provider on file    Admission Date:   Admission Orders (From admission, onward)     Ordered        02/18/23 7043  1 [facility],[address] [place]  Once                      Discharge Date: 02/20/23    Consultations During Hospital Stay:  · Podiatry    Procedures Performed:   · None    Significant Findings / Test Results:   XR ankle 3+ vw left   Final Result      Stable appearance to the distal fibular and posterior tibial malleolar fractures  No new abnormalities  Workstation performed: [id number]         XR ankle 3+ views LEFT   Final Result      Improved alignment of acute distal fibular and posterior tibial fractures post casting            Workstation performed: [id number]         CT head without contrast   Final Result      No acute intracranial abnormality  Findings are consistent with the preliminary report from [facility] which was provided shortly after completion of the exam                Workstation performed: [id number]         XR ankle 3+ views LEFT   Final Result      Fracture dislocation left ankle, as described above  Please see discussion  The images are available for clinical review  The study was marked in [facility] for immediate notification  Workstation performed: [id number]         ·       Test Results Pending at Discharge (will require follow up):   · none     Outpatient Tests Requested:  · none    Complications:  none    Reason for Admission: Accidental overdose    Hospital Course:   Jonatan Sutton is a 49 y o  male patient who originally presented to the hospital on 2/18/2023 due to accidental overdose  Patient states he took fentanyl to relieve ankle pain  X-ray of the left ankle were obtained which revealed fibular and tibial fractures  Case was discussed with on-call orthopedic surgeon who recommended dietary consult  Podiatry did patient's chart and recommended outpatient follow-up for surgery in 1 to 2 weeks  Patient be discharged home  Patient was advised to follow-up with PCP  Please see above list of diagnoses and related plan for additional information       Condition at Discharge: good    Discharge Day Visit / Exam:   Subjective: Patient states he feels great and is ready to go home  Vitals: Blood Pressure: 151/85 (02/20/23 0815)  Pulse: 75 (02/20/23 0815)  Temperature: 97 8 °F (36 6 °C) (02/20/23 0815)  Temp Source: Temporal (02/20/23 0815)  Respirations: 16 (02/20/23 0815)  Height: 5' 11" (180 3 cm) (02/19/23 0028)  Weight - Scale: 120 kg (263 lb 14 3 oz) (02/19/23 0028)  SpO2: 94 % (02/20/23 0815)  Exam:   Physical Exam  Constitutional:       Appearance: He is obese  HENT:      Head: Normocephalic  Cardiovascular:      Rate and Rhythm: Normal rate and regular rhythm  Pulses: Normal pulses  Heart sounds: Normal heart sounds  Pulmonary:      Effort: Pulmonary effort is normal       Breath sounds: Normal breath sounds  Abdominal:      General: Abdomen is flat  Bowel sounds are normal       Palpations: Abdomen is soft  Musculoskeletal:         General: Swelling present  Neurological:      General: No focal deficit present  Mental Status: He is alert and oriented to person, place, and time  Mental status is at baseline  Psychiatric:         Mood and Affect: Mood normal          Behavior: Behavior normal          Thought Content: Thought content normal          Judgment: Judgment normal           Discussion with Family: Patient declined call to   Discharge instructions/Information to patient and family:   See after visit summary for information provided to patient and family  Provisions for Follow-Up Care:  See after visit summary for information related to follow-up care and any pertinent home health orders  Disposition:   Home    Planned Readmission: no     Discharge Statement:  I spent 38 minutes discharging the patient  This time was spent on the day of discharge  I had direct contact with the patient on the day of discharge   Greater than 50% of the total time was spent examining patient, answering all patient questions, arranging and discussing plan of care with patient as well as directly providing post-discharge instructions  Additional time then spent on discharge activities  Discharge Medications:  See after visit summary for reconciled discharge medications provided to patient and/or family        **Please Note: This note may have been constructed using a voice recognition system**

## 2023-02-20 NOTE — PLAN OF CARE
Problem: Potential for Falls  Goal: Patient will remain free of falls  Description: INTERVENTIONS:  - Educate patient/family on patient safety including physical limitations  - Instruct patient to call for assistance with activity   - Consult OT/PT to assist with strengthening/mobility   - Keep Call bell within reach  - Keep bed low and locked with side rails adjusted as appropriate  - Keep care items and personal belongings within reach  - Initiate and maintain comfort rounds  - Make Fall Risk Sign visible to staff    - Apply yellow socks and bracelet for high fall risk patients  - Consider moving patient to room near nurses station  Outcome: Progressing     Problem: MOBILITY - ADULT  Goal: Maintain or return to baseline ADL function  Description: INTERVENTIONS:  -  Assess patient's ability to carry out ADLs; assess patient's baseline for ADL function and identify physical deficits which impact ability to perform ADLs (bathing, care of mouth/teeth, toileting, grooming, dressing, etc )  - Assess/evaluate cause of self-care deficits   - Assess range of motion  - Assess patient's mobility; develop plan if impaired  - Assess patient's need for assistive devices and provide as appropriate  - Encourage maximum independence but intervene and supervise when necessary  - Involve family in performance of ADLs  - Assess for home care needs following discharge   - Consider OT consult to assist with ADL evaluation and planning for discharge  - Provide patient education as appropriate  Outcome: Progressing  Goal: Maintains/Returns to pre admission functional level  Description: INTERVENTIONS:  - Perform BMAT or MOVE assessment daily    - Set and communicate daily mobility goal to care team and patient/family/caregiver     - Collaborate with rehabilitation services on mobility goals if consulted    - Out of bed for toileting  - Record patient progress and toleration of activity level   Outcome: Progressing     Problem: PAIN - ADULT  Goal: Verbalizes/displays adequate comfort level or baseline comfort level  Description: Interventions:  - Encourage patient to monitor pain and request assistance  - Assess pain using appropriate pain scale  - Administer analgesics based on type and severity of pain and evaluate response  - Implement non-pharmacological measures as appropriate and evaluate response  - Consider cultural and social influences on pain and pain management  - Notify physician/advanced practitioner if interventions unsuccessful or patient reports new pain  Outcome: Progressing     Problem: INFECTION - ADULT  Goal: Absence or prevention of progression during hospitalization  Description: INTERVENTIONS:  - Assess and monitor for signs and symptoms of infection  - Monitor lab/diagnostic results  - Monitor all insertion sites, i e  indwelling lines, tubes, and drains  - Monitor endotracheal if appropriate and nasal secretions for changes in amount and color  - [place] appropriate cooling/warming therapies per order  - Administer medications as ordered  - Instruct and encourage patient and family to use good hand hygiene technique  - Identify and instruct in appropriate isolation precautions for identified infection/condition  Outcome: Progressing  Goal: Absence of fever/infection during neutropenic period  Description: INTERVENTIONS:  - Monitor WBC    Outcome: Progressing     Problem: SAFETY ADULT  Goal: Patient will remain free of falls  Description: INTERVENTIONS:  - Educate patient/family on patient safety including physical limitations  - Instruct patient to call for assistance with activity   - Consult OT/PT to assist with strengthening/mobility   - Keep Call bell within reach  - Keep bed low and locked with side rails adjusted as appropriate  - Keep care items and personal belongings within reach  - Initiate and maintain comfort rounds  - Make Fall Risk Sign visible to staff    - Apply yellow socks and bracelet for high fall risk patients  - Consider moving patient to room near nurses station  Outcome: Progressing  Goal: Maintain or return to baseline ADL function  Description: INTERVENTIONS:  -  Assess patient's ability to carry out ADLs; assess patient's baseline for ADL function and identify physical deficits which impact ability to perform ADLs (bathing, care of mouth/teeth, toileting, grooming, dressing, etc )  - Assess/evaluate cause of self-care deficits   - Assess range of motion  - Assess patient's mobility; develop plan if impaired  - Assess patient's need for assistive devices and provide as appropriate  - Encourage maximum independence but intervene and supervise when necessary  - Involve family in performance of ADLs  - Assess for home care needs following discharge   - Consider OT consult to assist with ADL evaluation and planning for discharge  - Provide patient education as appropriate  Outcome: Progressing  Goal: Maintains/Returns to pre admission functional level  Description: INTERVENTIONS:  - Perform BMAT or MOVE assessment daily    - Set and communicate daily mobility goal to care team and patient/family/caregiver     - Collaborate with rehabilitation services on mobility goals if consulted    - Record patient progress and toleration of activity level   Outcome: Progressing     Problem: DISCHARGE PLANNING  Goal: Discharge to home or other facility with appropriate resources  Description: INTERVENTIONS:  - Identify barriers to discharge w/patient and caregiver  - Arrange for needed discharge resources and transportation as appropriate  - Identify discharge learning needs (meds, wound care, etc )  - Arrange for interpretive services to assist at discharge as needed  - Refer to Case Management Department for coordinating discharge planning if the patient needs post-hospital services based on physician/advanced practitioner order or complex needs related to functional status, cognitive ability, or social support system  Outcome: Progressing     Problem: Knowledge Deficit  Goal: Patient/family/caregiver demonstrates understanding of disease process, treatment plan, medications, and discharge instructions  Description: Complete learning assessment and assess knowledge base    Interventions:  - Provide teaching at level of understanding  - Provide teaching via preferred learning methods  Outcome: Progressing     Problem: Prexisting or High Potential for Compromised Skin Integrity  Goal: Skin integrity is maintained or improved  Description: INTERVENTIONS:  - Identify patients at risk for skin breakdown  - Assess and monitor skin integrity  - Assess and monitor nutrition and hydration status  - Monitor labs   - Assess for incontinence   - Turn and reposition patient  - Assist with mobility/ambulation  - Relieve pressure over bony prominences  - Avoid friction and shearing  - Provide appropriate hygiene as needed including keeping skin clean and dry  - Evaluate need for skin moisturizer/barrier cream  - Collaborate with interdisciplinary team   - Patient/family teaching  - Consider wound care consult   Outcome: Progressing

## 2023-02-20 NOTE — PLAN OF CARE
Problem: Potential for Falls  Goal: Patient will remain free of falls  Description: INTERVENTIONS:  - Educate patient/family on patient safety including physical limitations  - Instruct patient to call for assistance with activity   - Consult OT/PT to assist with strengthening/mobility   - Keep Call bell within reach  - Keep bed low and locked with side rails adjusted as appropriate  - Keep care items and personal belongings within reach  - Initiate and maintain comfort rounds    - Apply yellow socks and bracelet for high fall risk patients  - Consider moving patient to room near nurses station  2/20/2023 1126 by Shannan Lewis RN  Outcome: Completed  2/20/2023 1045 by Shannan Lewis RN  Outcome: Progressing     Problem: MOBILITY - ADULT  Goal: Maintain or return to baseline ADL function  Description: INTERVENTIONS:  -  Assess patient's ability to carry out ADLs; assess patient's baseline for ADL function and identify physical deficits which impact ability to perform ADLs (bathing, care of mouth/teeth, toileting, grooming, dressing, etc )  - Assess/evaluate cause of self-care deficits   - Assess range of motion  - Assess patient's mobility; develop plan if impaired  - Assess patient's need for assistive devices and provide as appropriate  - Encourage maximum independence but intervene and supervise when necessary  - Involve family in performance of ADLs  - Assess for home care needs following discharge   - Consider OT consult to assist with ADL evaluation and planning for discharge  - Provide patient education as appropriate  2/20/2023 1126 by Shannan Lewis RN  Outcome: Completed  2/20/2023 1045 by Shannan Lewis RN  Outcome: Progressing  Goal: Maintains/Returns to pre admission functional level  Description: INTERVENTIONS:  - Perform BMAT or MOVE assessment daily    - Set and communicate daily mobility goal to care team and patient/family/caregiver       - Record patient progress and toleration of activity level 2/20/2023 1126 by Latricia Griffith RN  Outcome: Completed  2/20/2023 1045 by Latricia Griffith RN  Outcome: Progressing     Problem: PAIN - ADULT  Goal: Verbalizes/displays adequate comfort level or baseline comfort level  Description: Interventions:  - Encourage patient to monitor pain and request assistance  - Assess pain using appropriate pain scale  - Administer analgesics based on type and severity of pain and evaluate response  - Implement non-pharmacological measures as appropriate and evaluate response  - Consider cultural and social influences on pain and pain management  - Notify physician/advanced practitioner if interventions unsuccessful or patient reports new pain  2/20/2023 1126 by Latricia Griffith RN  Outcome: Completed  2/20/2023 1045 by Latricia Griffith RN  Outcome: Progressing     Problem: INFECTION - ADULT  Goal: Absence or prevention of progression during hospitalization  Description: INTERVENTIONS:  - Assess and monitor for signs and symptoms of infection  - Monitor lab/diagnostic results  - Monitor all insertion sites, i e  indwelling lines, tubes, and drains  - Monitor endotracheal if appropriate and nasal secretions for changes in amount and color  - [place] appropriate cooling/warming therapies per order  - Administer medications as ordered  - Instruct and encourage patient and family to use good hand hygiene technique  - Identify and instruct in appropriate isolation precautions for identified infection/condition  2/20/2023 1126 by Latricia Griffith RN  Outcome: Completed  2/20/2023 1045 by Latricia Griffith RN  Outcome: Progressing  Goal: Absence of fever/infection during neutropenic period  Description: INTERVENTIONS:  - Monitor WBC    2/20/2023 1126 by Latricia Griffith RN  Outcome: Completed  2/20/2023 1045 by Latricia Griffith RN  Outcome: Progressing     Problem: SAFETY ADULT  Goal: Patient will remain free of falls  Description: INTERVENTIONS:  - Educate patient/family on patient safety including physical limitations  - Instruct patient to call for assistance with activity   - Consult OT/PT to assist with strengthening/mobility   - Keep Call bell within reach  - Keep bed low and locked with side rails adjusted as appropriate  - Keep care items and personal belongings within reach  - Initiate and maintain comfort rounds  -    - Apply yellow socks and bracelet for high fall risk patients  - Consider moving patient to room near nurses station  2/20/2023 1126 by Denton Garza RN  Outcome: Completed  2/20/2023 1045 by Denton Garza RN  Outcome: Progressing  Goal: Maintain or return to baseline ADL function  Description: INTERVENTIONS:  -  Assess patient's ability to carry out ADLs; assess patient's baseline for ADL function and identify physical deficits which impact ability to perform ADLs (bathing, care of mouth/teeth, toileting, grooming, dressing, etc )  - Assess/evaluate cause of self-care deficits   - Assess range of motion  - Assess patient's mobility; develop plan if impaired  - Assess patient's need for assistive devices and provide as appropriate  - Encourage maximum independence but intervene and supervise when necessary  - Involve family in performance of ADLs  - Assess for home care needs following discharge   - Consider OT consult to assist with ADL evaluation and planning for discharge  - Provide patient education as appropriate  2/20/2023 1126 by Denton Garza RN  Outcome: Completed  2/20/2023 1045 by Denton Garza RN  Outcome: Progressing  Goal: Maintains/Returns to pre admission functional level  Description: INTERVENTIONS:  - Perform BMAT or MOVE assessment daily    - Set and communicate daily mobility goal to care team and patient/family/caregiver       -   - Out of bed for toileting  - Record patient progress and toleration of activity level   2/20/2023 1126 by Denton Garza RN  Outcome: Completed  2/20/2023 1045 by Denton Garza RN  Outcome: Progressing     Problem: DISCHARGE PLANNING  Goal: Discharge to home or other facility with appropriate resources  Description: INTERVENTIONS:  - Identify barriers to discharge w/patient and caregiver  - Arrange for needed discharge resources and transportation as appropriate  - Identify discharge learning needs (meds, wound care, etc )  - Arrange for interpretive services to assist at discharge as needed  - Refer to Case Management Department for coordinating discharge planning if the patient needs post-hospital services based on physician/advanced practitioner order or complex needs related to functional status, cognitive ability, or social support system  2/20/2023 1126 by Anamika Fall RN  Outcome: Completed  2/20/2023 1045 by Anamika Fall RN  Outcome: Progressing     Problem: Knowledge Deficit  Goal: Patient/family/caregiver demonstrates understanding of disease process, treatment plan, medications, and discharge instructions  Description: Complete learning assessment and assess knowledge base    Interventions:  - Provide teaching at level of understanding  - Provide teaching via preferred learning methods  2/20/2023 1126 by Anamika Fall RN  Outcome: Completed  2/20/2023 1045 by Anamika Fall RN  Outcome: Progressing     Problem: Prexisting or High Potential for Compromised Skin Integrity  Goal: Skin integrity is maintained or improved  Description: INTERVENTIONS:  - Identify patients at risk for skin breakdown  - Assess and monitor skin integrity  - Assess and monitor nutrition and hydration status  - Monitor labs   - Assess for incontinence   - Turn and reposition patient  - Assist with mobility/ambulation  - Relieve pressure over bony prominences  - Avoid friction and shearing  - Provide appropriate hygiene as needed including keeping skin clean and dry  - Evaluate need for skin moisturizer/barrier cream  - Collaborate with interdisciplinary team   - Patient/family teaching  - Consider wound care consult   2/20/2023 1126 by Sue Marin Stevan RN  Outcome: Completed  2/20/2023 1045 by Vanessa Cifuentes RN  Outcome: Progressing

## 2023-02-20 NOTE — CASE MANAGEMENT
Case Management Assessment & Discharge Planning Note    Patient name Davi Mendieta  Location 7T [facility] 708/7T [facility] 708-01 MRN [id number]  : 1975 Date 2023       Current Admission Date: 2023  Current Admission Diagnosis:Closed fracture of distal end of left tibia   Patient Active Problem List    Diagnosis Date Noted   • Accidental overdose 2023   • Closed fracture of distal end of left tibia 2023   • Dislocation of left ankle joint 2023   • Opioid use disorder 2023   • Closed nondisplaced spiral fracture of shaft of left fibula with delayed healing 2023      LOS (days): 2  Geometric Mean LOS (GMLOS) (days):   Days to GMLOS:     OBJECTIVE:    Risk of Unplanned Readmission Score: 10 69      Current admission status: Inpatient     Preferred Pharmacy:   [address] [place] [address]  No address on file      [address], [address] [place] [address],[address] [address]  Phone: [phone] Fax: [phone]    Primary Care Provider: No primary care provider on file  Primary Insurance: Lolis Blakely  Secondary Insurance:     ASSESSMENT:  Active Health Care Proxies    There are no active Health Care Proxies on file  Readmission Root Cause  30 Day Readmission: No    Patient Information  Admitted from[de-identified] Home  Mental Status: Alert  During Assessment patient was accompanied by: Not accompanied during assessment  Assessment information provided by[de-identified] Patient  Primary Caregiver: Self  Support Systems: [facility] of Residence: [address] [place] [address] do you live in?: Address: [address]; Pt's Phone number is [phone]  Home entry access options   Select all that apply : Stairs  Number of steps to enter home : One Flight  Do the steps have railings?: Yes  Type of Current Residence: Other (Comment) (Private residence)  In the last 12 months, was there a time when you were not able to pay the mortgage or rent on time?: No  In the last 12 months, how many places have you lived?: 1  In the last 12 months, was there a time when you did not have a steady place to sleep or slept in a shelter (including now)?: No  Homeless/housing insecurity resource given?: Refused  Living Arrangements: Lives Alone  Is patient a ?: No    Activities of Daily Living Prior to Admission  Functional Status: Independent  Completes ADLs independently?: Yes  Ambulates independently?: Yes  Does patient use assisted devices?: No  Does patient currently own DME?: No  Does patient have a history of Outpatient Therapy (PT/OT)?: No  Does the patient have a history of Short-Term Rehab?: No  Does patient have a history of HHC?: No  Does patient currently have [employer] ?: No      Patient Information Continued  Income Source: Employed  Does patient have prescription coverage?: Yes  Within the past 12 months, you worried that your food would run out before you got the money to buy more : Sometimes true  Within the past 12 months, the food you bought just didn't last and you didn't have money to get more : Sometimes true  Food insecurity resource given?: Refused  Does patient receive dialysis treatments?: No  Does patient have a history of substance abuse?: Yes  Historical substance use preference:  (Opoid Use disorder)  History of Withdrawal Symptoms: Denies past symptoms  Is patient currently in treatment for substance abuse?: Yes  Does patient have a history of Mental Health Diagnosis?: No    PHQ 2/9 Screening   Reviewed PHQ 2/9 Depression Screening Score?: No    Means of Transportation  Means of Transport to Appts[de-identified] Friends  In the past 12 months, has lack of transportation kept you from medical appointments or from getting medications?: No  In the past 12 months, has lack of transportation kept you from meetings, work, or from getting things needed for daily living?: No  Was application for public transport provided?: Refused        DISCHARGE DETAILS:    Discharge planning discussed with[de-identified] pt  Freedom of Choice: Yes  Comments - Freedom of Choice: Pt will return back to his previous environment  Pt will follow up with Drug and Alcohol treatment Out Patient  CM updated pt's AVS with resources  CM contacted family/caregiver?: No- see comments (No Emergency contact)  Were Treatment Team discharge recommendations reviewed with patient/caregiver?: No  Did patient/caregiver verbalize understanding of patient care needs?: No  Were patient/caregiver advised of the risks associated with not following Treatment Team discharge recommendations?: No- see comments (Pt stated he has no emergency contact)    Other Referral/Resources/Interventions Provided:  Interventions: PCP, Health Education, Education, Evalina Duverney, DME  Referral Comments: Pt will follow up with Host program O/P  CM uploaded resources for pt on AVS  CM sent inBoxCatet message for PCP  Pt does not have a PCP    Treatment Team Recommendation: Substance Abuse Treatment; Host program   Discharge Destination Plan[de-identified] Substance Abuse Treatment  Transport at Discharge : Auto with designated   Dispatcher Contacted: Yes     Transported by [employer] and Unit #):  [place] Lyft ride     Transfer Mode: Crutches  Accompanied by: Alone

## 2023-02-20 NOTE — PLAN OF CARE
Problem: PHYSICAL THERAPY ADULT  Goal: Performs mobility at highest level of function for planned discharge setting  See evaluation for individualized goals  Description: Treatment/Interventions: ADL retraining, Functional transfer training, LE strengthening/ROM, Elevations, Therapeutic exercise, Endurance training, Patient/family training, Equipment eval/education, Bed mobility, Gait training, Spoke to nursing, Spoke to case management, OT  Equipment Recommended: Obie Castleman       See flowsheet documentation for full assessment, interventions and recommendations  Outcome: Progressing  Note: Prognosis: Good  Problem List: Decreased strength, Decreased endurance, Impaired balance, Decreased mobility, Decreased coordination, Pain, Orthopedic restrictions     Barriers to Discharge: Inaccessible home environment, Decreased caregiver support     PT Discharge Recommendation: No rehabilitation needs    See flowsheet documentation for full assessment

## 2023-02-20 NOTE — UTILIZATION REVIEW
Initial Clinical Review    Admission: Date/Time/Statement:   Admission Orders (From admission, onward)     Ordered        02/18/23 2333  INPATIENT ADMISSION  Once                      Orders Placed This Encounter   Procedures   • INPATIENT ADMISSION     Standing Status:   Standing     Number of Occurrences:   1     Order Specific Question:   Level of Care     Answer:   Med Surg [16]     Order Specific Question:   Estimated length of stay     Answer:   More than 2 Midnights     Order Specific Question:   Certification     Answer:   I certify that inpatient services are medically necessary for this patient for a duration of greater than two midnights  See H&P and MD Progress Notes for additional information about the patient's course of treatment  ED Arrival Information     Expected   -    Arrival   2/18/2023 20:41    Acuity   Urgent            Means of arrival   Ambulance    Escorted by   [place] ([address])    Service   Hospitalist    Admission type   Emergency            Arrival complaint   overdose            Chief Complaint   Patient presents with   • Overdose - Accidental     Pt presents  to the ED via EMS after an overdose  Pt was in a bar and was a witnessed fall  Pt states that he snorted something today that he thinks he took fentanyl  Pt given 2mg narcan IN and 4mg zofran IV  States that he remembers going into the bar but nothing afterwards  Obvious deformity noted to L ankle  Initial Presentation: 49 y o  male presents to ED via  EMS from home after a fall/accidental overdose  Follows  OP with methadone clinic,  Currently on   5  Mg daily, has been tapering down for the past  18 months  Felt increased pain/withdrawal  Over the past week and snorted fentanyl  [place]  Not  Remember events  Past  That  Thinks  He missed a 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  Pain  7/10  Additional PMH  Is  Tobacco abuse and SUSAN   Imaging  Shows dislocation  Left ankle, delayed healing left tibia fracture  Admit  Ip with  Accidental overdose, dislocation left ankle joint, closed fracture left tibia  Closed nondisplaced spiral fracture left fibula and plan is pain control, podiatry consult,  NWB and close monitoring  Podiatry consult  Surgery   Non emergent  Needs to be  NWB  Needs  Soft tissue envelope to calm down prior to surgical  Intervention  Plan OP   Intervention  Date:   2/19        Day 2:   Remains  NWB  Continue pain control  Needs  PT/OT  2/20      D/C  Home with Op  Surgery in  1 week    ED Triage Vitals   Temperature Pulse Respirations Blood Pressure SpO2   02/18/23 2043 02/18/23 2043 02/18/23 2043 02/18/23 2043 02/18/23 2043   97 7 °F (36 5 °C) 92 12 114/65 91 %      Temp Source Heart Rate Source Patient Position - Orthostatic VS BP Location FiO2 (%)   02/18/23 2043 02/18/23 2043 02/18/23 2043 02/18/23 2043 --   Oral Monitor Lying Left arm       Pain Score       02/19/23 0116       8          Wt Readings from Last 1 Encounters:   02/19/23 120 kg (263 lb 14 3 oz)     Additional Vital Signs:   97 2 °F (36 2 °C) Abnormal  109 Abnormal  17 148/106 Abnormal  117 95 % -- -- None (Room air) Lying    02/19/23 0710 98 3 °F (36 8 °C) 90 16 128/61 90 92 % -- -- -- Lying   02/19/23 0028 96 3 °F (35 7 °C) Abnormal  62 16 99/72 77 97 % 36 4 L/min Nasal cannula Lying   02/18/23 2043 97 7 °F (36 5 °C) 92 12 114/65 -- 91 % -- -- Nasal cannula Lying       Pertinent Labs/Diagnostic Test Results:   XR ankle 3+ vw left   Final Result by Jenise Butcher MD (02/19 1030)      Stable appearance to the distal fibular and posterior tibial malleolar fractures  No new abnormalities              Workstation performed: [id number]         XR ankle 3+ views LEFT   Final Result by Brent Manzano MD (02/19 9744)      Improved alignment of acute distal fibular and posterior tibial fractures post casting            Workstation performed: [id number]         CT head without contrast   Final Result by Ra Bourne MD (02/19 7475)      No acute intracranial abnormality  Findings are consistent with the preliminary report from [facility] which was provided shortly after completion of the exam                Workstation performed: [id number]         XR ankle 3+ views LEFT   Final Result by Vincent Hernandez MD (02/18 2307)      Fracture dislocation left ankle, as described above  Please see discussion  The images are available for clinical review  The study was marked in [facility] for immediate notification               Workstation performed: [id number]               Results from last 7 days   Lab Units 02/20/23  0734 02/19/23  0519 02/18/23  2115   WBC Thousand/uL 10 63* 15 63* 12 28*   HEMOGLOBIN g/dL 13 9 15 5 15 2   HEMATOCRIT % 41 9 46 5 44 9   PLATELETS Thousands/uL 200 229 216   NEUTROS ABS Thousands/µL  --   --  8 09*         Results from last 7 days   Lab Units 02/20/23  0734 02/19/23  0519 02/18/23  2115   SODIUM mmol/L 135 138 137   POTASSIUM mmol/L 3 9 5 6* 3 9   CHLORIDE mmol/L 98 103 104   CO2 mmol/L 30 26 23   ANION GAP mmol/L 7 9 10   BUN mg/dL 18 20 15   CREATININE mg/dL 0 81 1 56* 0 80   EGFR ml/min/1 73sq m 105 51 105   CALCIUM mg/dL 9 1 9 1 8 8             Results from last 7 days   Lab Units 02/20/23  0734 02/19/23  0519 02/18/23  2115   GLUCOSE RANDOM mg/dL 119* 115* 190*               Results from last 7 days   Lab Units 02/20/23  0734   PROCALCITONIN ng/ml 0 15           Results from last 7 days   Lab Units 02/20/23  0856   AMPH/METH  Negative   BARBITURATE UR  Negative   BENZODIAZEPINE UR  Negative   COCAINE UR  Negative   METHADONE URINE  Positive*   OPIATE UR  Negative   PCP UR  Negative   THC UR  Positive*             ED Treatment:   Medication Administration from 02/18/2023 2041 to 02/19/2023 0012       Date/Time Order Dose Route Action Comments     02/18/2023 2046 EST naloxone (FOR EMS ONLY) [facility]) 2 MG/2ML injection 2 mg 0 mg Does not apply Given to EMS -- 02/18/2023 2052 EST ondansetron (FOR EMS ONLY) (ZOFRAN) 4 mg/2 mL injection 4 mg 0 mg Does not apply Given to EMS --     02/18/2023 2341 EST tetanus-diphtheria-acellular pertussis (BOOSTRIX) IM injection 0 5 mL 0 5 mL Intramuscular Given --          Present on Admission:  • Accidental overdose  • Closed fracture of distal end of left tibia  • Dislocation of left ankle joint  • (Resolved) SUSAN (obstructive sleep apnea)  • Opioid use disorder      Admitting Diagnosis: Overdose [T50 901A]  Closed nondisplaced spiral fracture of shaft of left fibula with delayed healing [S82 445G]  Accidental fentanyl overdose, initial encounter ([facility]) [T40 411A]  Dislocation of left ankle joint, initial encounter [S93 05XA]  Closed fracture dislocation of left ankle, initial encounter [S82 892A]  Age/Sex: 49 y o  male  Admission Orders:  Scheduled Medications:  docusate sodium, 100 mg, Oral, BID  enoxaparin, 40 mg, Subcutaneous, Q24H [address]  nicotine, 1 patch, Transdermal, Daily      Continuous IV Infusions:     PRN Meds:  acetaminophen, 650 mg, Oral, Q4H PRN  calcium carbonate, 500 mg, Oral, Daily PRN  ondansetron, 4 mg, Intravenous, Q6H PRN  oxyCODONE, 10 mg, Oral, Q4H PRN  oxyCODONE, 5 mg, Oral, Q4H PRN        CONSULT TO CERTIFIED   IP CONSULT TO PODIATRY    Network Utilization Review Department  ATTENTION: Please call with any questions or concerns to [phone] and carefully listen to the prompts so that you are directed to the right person  All voicemails are confidential   Nathanael Paez all requests for admission clinical reviews, approved or denied determinations and any other requests to dedicated fax number below belonging to the campus where the patient is receiving treatment   List of dedicated fax numbers for the Facilities:  [address] [place] [address] DENIALS (Administrative/Medical Necessity) [phone]   [address] (Maternity/NICU/Pediatrics) [phone] 2600 [facility] Rachelle Notice [phone]   Cali Simon [address] [phone]   [address] [place] [id number] [place] [address] Ol[facility]a SalomonCo[facility] Centers[facility]  [phone]   [address] [facility]ober[facility] [address] 815 [facility] [phone]

## 2023-02-20 NOTE — NURSING NOTE
Pt discharged to home after instructions given on home medication as well as follow up, understands he has to call dr Rachel Holguin for appointment and to call for numbness LLE, also agreed to  medication at [facility], no distress on discharge, home with belongings and crutches

## 2023-02-20 NOTE — PHYSICAL THERAPY NOTE
Physical Therapy Evaluation    Patient's Name: Rik Arboleda    Admitting Diagnosis  Overdose [T50 901A]  Closed nondisplaced spiral fracture of shaft of left fibula with delayed healing [S82 445G]  Accidental fentanyl overdose, initial encounter ([facility] [address] ) [T40 411A]  Dislocation of left ankle joint, initial encounter [S93 05XA]  Closed fracture dislocation of left ankle, initial encounter [N25 493T]    Problem List  Patient Active Problem List   Diagnosis    Accidental overdose    Closed fracture of distal end of left tibia    Dislocation of left ankle joint    Opioid use disorder    Closed nondisplaced spiral fracture of shaft of left fibula with delayed healing       Past Medical History  Past Medical History:   Diagnosis Date    Kidney stone     Opiate abuse, continuous (HCC)     SUSAN (obstructive sleep apnea)     Subdural hematoma        Past Surgical History  Past Surgical History:   Procedure Laterality Date    BRAIN HEMATOMA EVACUATION      ORIF ELBOW FRACTURE Left        Recent Imaging  XR ankle 3+ vw left   Final Result by Kalpesh Ahuja MD (02/19 1030)      Stable appearance to the distal fibular and posterior tibial malleolar fractures  No new abnormalities  Workstation performed: [id number]         XR ankle 3+ views LEFT   Final Result by Johnny Rdz MD (86/94 2984)      Improved alignment of acute distal fibular and posterior tibial fractures post casting            Workstation performed: [id number]         CT head without contrast   Final Result by Samson Mao MD (02/19 6089)      No acute intracranial abnormality  Findings are consistent with the preliminary report from [facility] which was provided shortly after completion of the exam                Workstation performed: [id number]         XR ankle 3+ views LEFT   Final Result by David Valentine MD (02/18 1971)      Fracture dislocation left ankle, as described above  Please see discussion        The images are available for clinical review  The study was marked in [facility] for immediate notification  Workstation performed: [id number]             Recent Vital Signs  Vitals:    02/19/23 0710 02/19/23 1542 02/20/23 0000 02/20/23 0815   BP: 128/61 (!) 148/106 152/84 151/85   BP Location: Right arm Left arm Right arm Right arm   Pulse: 90 (!) 109 72 75   Resp: 16 17 16 16   Temp: 98 3 °F (36 8 °C) (!) 97 2 °F (36 2 °C) 97 7 °F (36 5 °C) 97 8 °F (36 6 °C)   TempSrc: Temporal Temporal Temporal Temporal   SpO2: 92% 95% 96% 94%   Weight:       Height:            02/20/23 1110   PT Last Visit   PT Visit Date 02/20/23   Note Type   Note type Evaluation   Pain Assessment   Pain Assessment Tool 0-10   Pain Score 7   Pain Location/Orientation Orientation: Left; Location: Foot   Restrictions/Precautions   Weight Bearing Precautions Per Order Yes   LLE Weight Bearing Per Order NWB   Other Precautions Pain;WBS   Home Living   Type of Home House   Additional Comments pt lives with others in house 2 story; stays on first floor with bathroom on second, reports available bathroom on first floor if needed   Prior Function   Level of [place] Independent with ADLs; Independent with functional mobility; Independent with IADLS   Lives With Friend(s)   Receives Help From Friend(s)   IADLs Independent with driving; Independent with meal prep   Falls in the last 6 months 1 to 4   General   Family/Caregiver Present No   Cognition   Overall Cognitive Status WFL   Arousal/Participation Alert   Orientation Level Oriented X4   Memory Within functional limits   Following Commands Follows all commands and directions without difficulty   RLE Assessment   RLE Assessment WFL   LLE Assessment   LLE Assessment   (3/5)   Coordination   Movements are Fluid and Coordinated 0   Coordination and Movement Description limited balance with NWB use of crutches and RW   Sensation WFL   Light Touch   RLE Light Touch Grossly intact   LLE Light Touch Grossly intact   Bed Mobility   Supine to Sit 6  Modified independent   Additional items Increased time required   Sit to Supine 6  Modified independent   Additional items Increased time required   Transfers   Sit to Stand 6  Modified independent   Additional items Increased time required   Stand to Sit 6  Modified independent   Additional items Increased time required   Additional Comments with RW   Ambulation/Elevation   Gait pattern   (NWB hopping with RLE)   Gait Assistance 5  Supervision   Additional items Verbal cues   Assistive Device Rolling walker;Crutches   Distance 50ft with both AC and RW   Ambulation/Elevation Additional Comments pt plans to bump up stairs; will have assistance with this at home   Balance   Static Sitting Good   Dynamic Sitting Good   Static Standing Fair   Dynamic Standing Fair -   Ambulatory Fair -   Endurance Deficit   Endurance Deficit Yes   Endurance Deficit Description reduced due to pain   Activity Tolerance   Activity Tolerance Patient tolerated treatment well   Medical Staff Made Aware spoke to CM   Nurse Made Aware spoke to RN   Assessment   Prognosis Good   Problem List Decreased strength;Decreased endurance; Impaired balance;Decreased mobility; Decreased coordination;Pain;Orthopedic restrictions   Barriers to Discharge Inaccessible home environment;Decreased caregiver support   Goals   Patient Goals to get back home   STG Expiration Date 03/02/23   Short Term Goal #1 see eval note   Plan   Treatment/Interventions ADL retraining;Functional transfer training;LE strengthening/ROM; Elevations; Therapeutic exercise; Endurance training;Patient/family training;Equipment eval/education; Bed mobility;Gait training;Spoke to nursing;Spoke to case management;OT   PT Frequency 2-3x/wk   Recommendation   PT Discharge Recommendation No rehabilitation needs   Equipment Recommended [place] walker   AM-PAC Basic Mobility Inpatient   Turning in Flat Bed Without Bedrails 4 Lying on Back to Sitting on Edge of Flat Bed Without Bedrails 4   Moving Bed to Chair 4   Standing Up From Chair Using Arms 3   Walk in Room 3   Climb 3-5 Stairs With Railing 3   Basic Mobility Inpatient Raw Score 21   Basic Mobility Standardized Score 45 55   Highest Level Of Mobility   -HLM Goal 6: Walk 10 steps or more   JH-HLM Achieved 7: Walk 25 feet or more   End of Consult   Patient Position at End of Consult Supine; All needs within reach         ASSESSMENT                                                                                                                     Julieta Abbasi is a 49 y o  male admitted to [facility] on 2/18/2023 for Closed fracture of distal end of left tibia  Pt  has a past medical history of Kidney stone, Opiate abuse, continuous ([address] ), SUSAN (obstructive sleep apnea), and Subdural hematoma    PT was consulted and pt was seen on 2/20/2023 for mobility assessment and d/c planning  Pt presents supine in bed alert and agreeable to therapy  Impairments limiting pt at this time include decreased ROM, impaired balance, decreased endurance, decreased coordination, new onset of impairment of functional mobility, decreased IADLS, pain, decreased activity tolerance, and decreased strength  Pt is currently functioning at a modified independent assistance level for bed mobility, modified independent assistance level for transfers, supervision assistance x1 level for ambulation with Rolling Walker and Crutches  The patient's AM-PAC Basic Mobility Inpatient Short Form Raw Score is 21  A Raw score of greater than 16 suggests the patient may benefit from discharge to home  Please also refer to the recommendation of the Physical Therapist for safe discharge planning      Goals                                                                                                                                    1) Bed mobility skills with modified independent assistance to facilitate safe return to previous living environment 2) Functional transfers with modified independent assistance to facilitate safe return to previous living environment  3) Ambulation with least restrictive AD modified independent assistance without LOB and stable vitals for safe ambulation home/ community distances  4) Stair training up/down flight 12 step/s with appropriate rail/s  and modified independent assistance for safe access to previous living environment  5) Improve balance grades to fair + to reduce risk of falls  6)Improve LE strength grades by 1 to increase independence w/ transfers and gait  7) PT for ongoing pt and family education; DME needs and D/C planning to promote highest level of function in least restrictive environment  Recommendations                                                                                                              Pt will benefit from continued skilled IP PT to address the above mentioned impairments in order to maximize recovery and increase functional independence when completing mobility and ADLs  See flow sheet for goals and POC       DME: Leslie [facility]les and Crutches    Discharge Disposition:  Home with no needs      Trudy Lynne PT, DPT

## 2023-02-20 NOTE — ASSESSMENT & PLAN NOTE
Podiatry consulted  NWB    Will need surgery on outpatient basis in 1-2 weeks per podiatry, will follow with podiatry on outpatient basis    Patient given ambulatory referral and phone number of podiatry clinic on discharge

## 2023-02-20 NOTE — ASSESSMENT & PLAN NOTE
ED reviewed w/ orthopedics and plan would be surgery in 1 week, however, patient home alone and not able to manage  Ortho recommended medical admission and podiatry consult    Podiatry evaluated patient on 2/19, recommend outpatient follow-up with podiatry with potential surgery in 1 week or so     PT/OT recommending home with walker  Patient will be discharged home

## 2023-02-20 NOTE — CERTIFIED RECOVERY SPECIALIST
Certified  Note    Patient name: Missael Carlisle  Location: [facility] 7[facility]-01  [place]: [address] [place] [address]  Attending:  Toby Lopez MD MRN [id number]  : 1975  Age: 49 y o  Sex: male Date 2023         Substance Use History:     Social History     Substance and Sexual Activity   Alcohol Use Not Currently        Social History     Substance and Sexual Activity   Drug Use Yes   • Types: Fentanyl     CRS received consult to meet with patient  CRS will forward to SHARE CRS to meet due to inpatient hospitalization in [facility]

## 2023-02-21 ENCOUNTER — CONSULT (OUTPATIENT)
Dept: INTERNAL MEDICINE CLINIC | Facility: OTHER | Age: 48
End: 2023-02-21

## 2023-02-21 VITALS
DIASTOLIC BLOOD PRESSURE: 88 MMHG | RESPIRATION RATE: 20 BRPM | OXYGEN SATURATION: 96 % | HEIGHT: 71 IN | BODY MASS INDEX: 36.48 KG/M2 | SYSTOLIC BLOOD PRESSURE: 140 MMHG | HEART RATE: 122 BPM | TEMPERATURE: 97.9 F | WEIGHT: 260.6 LBS

## 2023-02-21 DIAGNOSIS — F11.90 OPIOID USE DISORDER: ICD-10-CM

## 2023-02-21 DIAGNOSIS — Z13.220 ENCOUNTER FOR LIPID SCREENING FOR CARDIOVASCULAR DISEASE: ICD-10-CM

## 2023-02-21 DIAGNOSIS — R73.09 ELEVATED GLUCOSE: ICD-10-CM

## 2023-02-21 DIAGNOSIS — S82.445G: ICD-10-CM

## 2023-02-21 DIAGNOSIS — Z01.818 PREOP EXAM FOR INTERNAL MEDICINE: Primary | ICD-10-CM

## 2023-02-21 DIAGNOSIS — S82.872D CLOSED DISPLACED PILON FRACTURE OF LEFT TIBIA WITH ROUTINE HEALING, SUBSEQUENT ENCOUNTER: ICD-10-CM

## 2023-02-21 DIAGNOSIS — Z13.6 ENCOUNTER FOR LIPID SCREENING FOR CARDIOVASCULAR DISEASE: ICD-10-CM

## 2023-02-21 DIAGNOSIS — Z13.228 SCREENING FOR METABOLIC DISORDER: ICD-10-CM

## 2023-02-21 PROBLEM — T50.901A ACCIDENTAL OVERDOSE: Status: RESOLVED | Noted: 2023-02-18 | Resolved: 2023-02-21

## 2023-02-21 PROBLEM — S93.05XA DISLOCATION OF LEFT ANKLE JOINT: Status: RESOLVED | Noted: 2023-02-18 | Resolved: 2023-02-21

## 2023-02-21 NOTE — PROGRESS NOTES
Subjective:  Chief Complaint   Patient presents with   • Pre-op Exam         Villa Perez is a 49y o  year old male who presents to the office today for a preoperative consultation at the request of surgeon Dr Brissa Marino who plans on performing left fibula and tibia fracture repair on March 8  This consultation is requested for the specific conditions prompting preoperative evaluation (i e  because of potential affect on operative risk)  Planned anesthesia: general  The patient has the following known anesthesia issues: none  Patients bleeding risk: no recent abnormal bleeding, no remote history of abnormal bleeding and no use of Ca-channel blockers  Patient does not have objections to receiving blood products if needed  Patient is able to walk 4 blocks without symptoms  Patient is able to walk up 2 flights of stairs without symptoms  Significant past medical history includes history of opioid use and recently on methadone (discontinued himself 3 weeks ago)  Tobacco use: positive, currently attempting to quit with nicotine patches  Alcohol use: Negative, sober since 5 years  Illicit drug use: CBD  Symptoms:   Easy bleeding: no  Easy bruising: no  Frequent nose bleeds: no  Chest pain: no  Cough: no  Dyspnea on exertion:no  Edema: no  Palpitations: no  Wheezing: no    Living situation: Patient lives with himself in a first floor home  His friend will be caring for him after surgery  hedoes not have post-op concerns  The following portions of the patient's history were reviewed and updated as appropriate: allergies, current medications, past family history, past medical history, past social history, past surgical history and problem list     Review of Systems  Review of Systems   Constitutional: Negative for activity change, appetite change, chills, diaphoresis, fatigue and fever  HENT: Negative for congestion, postnasal drip, rhinorrhea, sinus pressure, sinus pain, sneezing and sore throat  Eyes: Negative for visual disturbance  Respiratory: Negative for apnea, cough, choking, chest tightness, shortness of breath and wheezing  Cardiovascular: Negative for chest pain, palpitations and leg swelling  Gastrointestinal: Negative for abdominal distention, abdominal pain, anal bleeding, blood in stool, constipation, diarrhea, nausea and vomiting  Endocrine: Negative for cold intolerance and heat intolerance  Genitourinary: Negative for difficulty urinating, dysuria and hematuria  Musculoskeletal: Negative  Skin: Negative  Neurological: Negative for dizziness, weakness, light-headedness, numbness and headaches  Hematological: Negative for adenopathy  Psychiatric/Behavioral: Negative for agitation, sleep disturbance and suicidal ideas  All other systems reviewed and are negative  Past Medical History:   Diagnosis Date   • Accidental overdose 2/18/2023   • Kidney stone    • Opiate abuse, continuous (HCC)    • SUSAN (obstructive sleep apnea)    • Subdural hematoma      Past Surgical History:   Procedure Laterality Date   • BRAIN HEMATOMA EVACUATION     • ORIF ELBOW FRACTURE Left      Social History     Tobacco Use   • Smoking status: Every Day     Packs/day: 1 00     Types: Cigarettes   • Smokeless tobacco: Never   Substance Use Topics   • Alcohol use: Not Currently   • Drug use: Yes     Types: Fentanyl     Family History   Problem Relation Age of Onset   • No Known Problems Mother    • No Known Problems Father      Patient has no known allergies  Current Outpatient Medications   Medication Sig Dispense Refill   • nicotine (NICODERM CQ) 21 mg/24 hr TD 24 hr patch Place 1 patch on the skin over 24 hours daily Do not start before February 21, 2023  28 patch 0     No current facility-administered medications for this visit         Objective:       /88 (BP Location: Right arm, Patient Position: Sitting, Cuff Size: Large)   Pulse (!) 122   Temp 97 9 °F (36 6 °C) (Temporal) Resp 20    5' 11" (1 803 m)   Wt 118 kg (260 lb 9 6 oz)   SpO2 96%   BMI 36 35 kg/m²   Physical Exam  Vitals and nursing note reviewed  Constitutional:       General: He is not in acute distress  Appearance: He is well-developed  He is not diaphoretic  HENT:      Head: Normocephalic and atraumatic  Eyes:      General: No scleral icterus  Right eye: No discharge  Left eye: No discharge  Conjunctiva/sclera: Conjunctivae normal       Pupils: Pupils are equal, round, and reactive to light  Neck:      Thyroid: No thyromegaly  Vascular: No JVD  Cardiovascular:      Rate and Rhythm: Normal rate and regular rhythm  Heart sounds: Normal heart sounds  No murmur heard  No friction rub  No gallop  Pulmonary:      Effort: Pulmonary effort is normal  No respiratory distress  Breath sounds: Normal breath sounds  No wheezing or rales  Chest:      Chest wall: No tenderness  Abdominal:      General: Bowel sounds are normal  There is no distension  Palpations: Abdomen is soft  There is no mass  Tenderness: There is no abdominal tenderness  There is no guarding or rebound  Musculoskeletal:         General: No tenderness or deformity  Normal range of motion  Cervical back: Normal range of motion and neck supple  Feet:      Comments: Left leg in a cast  Lymphadenopathy:      Cervical: No cervical adenopathy  Skin:     General: Skin is warm and dry  Coloration: Skin is not pale  Findings: No erythema or rash  Neurological:      Mental Status: He is alert and oriented to person, place, and time  Cranial Nerves: No cranial nerve deficit  Coordination: Coordination normal       Deep Tendon Reflexes: Reflexes are normal and symmetric  Psychiatric:         Behavior: Behavior normal          Thought Content:  Thought content normal          Judgment: Judgment normal               Cardiographics  ECG: normal sinus rhythm, no blocks or conduction defects, no ischemic changes    Lab Review   Admission on 02/18/2023, Discharged on 02/20/2023   Component Date Value   • WBC 02/18/2023 12 28 (H)    • RBC 02/18/2023 4 91    • Hemoglobin 02/18/2023 15 2    • Hematocrit 02/18/2023 44 9    • MCV 02/18/2023 91    • MCH 02/18/2023 31 0    • MCHC 02/18/2023 33 9    • RDW 02/18/2023 12 4    • MPV 02/18/2023 9 5    • Platelets 22/49/9067 216    • nRBC 02/18/2023 0    • Neutrophils Relative 02/18/2023 66    • Immat GRANS % 02/18/2023 1    • Lymphocytes Relative 02/18/2023 23    • Monocytes Relative 02/18/2023 7    • Eosinophils Relative 02/18/2023 2    • Basophils Relative 02/18/2023 1    • Neutrophils Absolute 02/18/2023 8 09 (H)    • Immature Grans Absolute 02/18/2023 0 12    • Lymphocytes Absolute 02/18/2023 2 87    • Monocytes Absolute 02/18/2023 0 84    • Eosinophils Absolute 02/18/2023 0 27    • Basophils Absolute 02/18/2023 0 09    • Sodium 02/18/2023 137    • Potassium 02/18/2023 3 9    • Chloride 02/18/2023 104    • CO2 02/18/2023 23    • ANION GAP 02/18/2023 10    • BUN 02/18/2023 15    • Creatinine 02/18/2023 0 80    • Glucose 02/18/2023 190 (H)    • Calcium 02/18/2023 8 8    • eGFR 02/18/2023 105    • EXTBreath Alcohol 02/18/2023 0 0    • Amph/Meth UR 02/20/2023 Negative    • Barbiturate Ur 02/20/2023 Negative    • Benzodiazepine Urine 02/20/2023 Negative    • Cocaine Urine 02/20/2023 Negative    • Methadone Urine 02/20/2023 Positive (A)    • Opiate Urine 02/20/2023 Negative    • PCP Ur 02/20/2023 Negative    • THC Urine 02/20/2023 Positive (A)    • Oxycodone Urine 02/20/2023 Positive (A)    • Ventricular Rate 02/18/2023 70    • Atrial Rate 02/18/2023 70    • VT Interval 02/18/2023 150    • QRSD Interval 02/18/2023 102    • QT Interval 02/18/2023 426    • QTC Interval 02/18/2023 460    • P Axis 02/18/2023 42    • QRS Axis 02/18/2023 11    • T Wave Axis 02/18/2023 16    • Sodium 02/19/2023 138    • Potassium 02/19/2023 5 6 (H)    • Chloride 02/19/2023 103    • CO2 02/19/2023 26    • ANION GAP 02/19/2023 9    • BUN 02/19/2023 20    • Creatinine 02/19/2023 1 56 (H)    • Glucose 02/19/2023 115 (H)    • Calcium 02/19/2023 9 1    • eGFR 02/19/2023 51    • WBC 02/19/2023 15 63 (H)    • RBC 02/19/2023 4 98    • Hemoglobin 02/19/2023 15 5    • Hematocrit 02/19/2023 46 5    • MCV 02/19/2023 93    • MCH 02/19/2023 31 1    • MCHC 02/19/2023 33 3    • RDW 02/19/2023 12 7    • Platelets 12/05/1397 229    • MPV 02/19/2023 10 3    • Sodium 02/20/2023 135    • Potassium 02/20/2023 3 9    • Chloride 02/20/2023 98    • CO2 02/20/2023 30    • ANION GAP 02/20/2023 7    • BUN 02/20/2023 18    • Creatinine 02/20/2023 0 81    • Glucose 02/20/2023 119 (H)    • Calcium 02/20/2023 9 1    • eGFR 02/20/2023 105    • WBC 02/20/2023 10 63 (H)    • RBC 02/20/2023 4 54    • Hemoglobin 02/20/2023 13 9    • Hematocrit 02/20/2023 41 9    • MCV 02/20/2023 92    • MCH 02/20/2023 30 6    • MCHC 02/20/2023 33 2    • RDW 02/20/2023 12 4    • Platelets 37/41/8411 200    • MPV 02/20/2023 9 9    • Procalcitonin 02/20/2023 0 15    • Supplier Name 02/20/2023 [employer]    • Supplier Phone Number 02/20/2023 [phone]    • Order Status 02/20/2023 Contacting Patient    • Delivery Note 02/20/2023 Please deliver Yuliet Prime to Address: [address] [place], [place] [address]Please call pt: [phone]    • Delivery Request Date 02/20/2023 02/20/2023    • Item Description 02/20/2023 [facility], Adult         Assessment:     49 y o  male with planned surgery as above  Known risk factors for perioperative complications: None      Cardiac Risk Estimation: low risk    [place] is cleared from a cardiovascular standpoint to proceed with surgery  he is at a low risk from a cardiovascular standpoint at this time without any additional cardiac testing  Reevaluation needed if he should present with symptoms prior to surgery  Plan:  Preoperative workup as follows none    Change in medication regimen before surgery: none, continue medication regimen including morning of surgery, with sip of water  Assessment/Plan:    Problem List Items Addressed This Visit        Musculoskeletal and Integument    Closed fracture of distal end of left tibia    Closed nondisplaced spiral fracture of shaft of left fibula with delayed healing       Other    Opioid use disorder     He was able to wean off methadone, last used 3 weeks ago  Preop exam for internal medicine - Primary   Other Visit Diagnoses     Elevated glucose        Relevant Orders    Hemoglobin A1C    Encounter for lipid screening for cardiovascular disease        Relevant Orders    Lipid panel    Screening for metabolic disorder        Relevant Orders    CBC    Comprehensive metabolic panel          BMI Counseling: Body mass index is 36 35 kg/m²  The BMI is above normal  Nutrition recommendations include decreasing portion sizes, encouraging healthy choices of fruits and vegetables, decreasing fast food intake, consuming healthier snacks, limiting drinks that contain sugar, moderation in carbohydrate intake, increasing intake of lean protein, reducing intake of saturated and trans fat and reducing intake of cholesterol  Exercise recommendations include moderate physical activity 150 minutes/week and exercising 3-5 times per week  No pharmacotherapy was ordered  Patient referred to PCP  Rationale for BMI follow-up plan is due to patient being overweight or obese

## 2023-02-22 NOTE — UTILIZATION REVIEW
NOTIFICATION OF ADMISSION DISCHARGE   This is a Notification of Discharge from [facility]  Please be advised that this patient has been discharge from our facility  Below you will find the admission and discharge date and time including the patient’s disposition  UTILIZATION REVIEW CONTACT:  Billy Peres MA  Utilization   Network Utilization Review Department  Phone: [phone] x carefully listen to the prompts  All voicemails are confidential   Email: [email or web address]     ADMISSION INFORMATION  PRESENTATION DATE: 2/18/2023  8:43 PM  OBERVATION ADMISSION DATE:   INPATIENT ADMISSION DATE: 2/18/23 11:33 PM   DISCHARGE DATE: 2/20/2023  1:05 PM   DISPOSITION:Home/Self Care    IMPORTANT INFORMATION:  Send all requests for admission clinical reviews, approved or denied determinations and any other requests to dedicated fax number below belonging to the campus where the patient is receiving treatment   List of dedicated fax numbers:  [address] DENIALS (Administrative/Medical Necessity) [phone]   [address] (Maternity/NICU/Pediatrics) [phone]   [facility] [phone]   [facility] 87 [phone]   [address] [phone]   220 [facility] [phone] 90 [facility] [phone]   [address] [place], [place] [address]alyse\[facility]\"" 119 [phone]   [facility]  [phone] 4058 [facility] [phone]   412 [facility] [address] [facility] [phone]

## 2023-02-23 NOTE — UTILIZATION REVIEW
NOTIFICATION OF INPATIENT ADMISSION   AUTHORIZATION REQUEST   SERVICING FACILITY:   [address] [place] [address]  [address] , [facility], [facility]  Tax ID: [id number]  NPI: [id number] ATTENDING PROVIDER:  Attending Name and NPI#: Jonathan Francis [[id number]]  Address: [address] , [facility], [address] [place] [address]  Phone: [phone]     ADMISSION INFORMATION:  Place of Service: Inpatient [address] [facility]  [address]  Place of Service Code: 21  Inpatient Admission Date/Time: 2/18/23 11:33 PM  Discharge Date/Time: 2/20/2023  1:05 PM  Admitting Diagnosis Code/Description:  Overdose [T50 901A]  Closed nondisplaced spiral fracture of shaft of left fibula with delayed healing [S82 445G]  Accidental fentanyl overdose, initial encounter [facility]) [T40 411A]  Dislocation of left ankle joint, initial encounter [S93 05XA]  Closed fracture dislocation of left ankle, initial encounter [F33 700N]     UTILIZATION REVIEW CONTACT:  Levi Castro Utilization   Network Utilization Review Department  Phone: [phone]  Fax [phone]  Email: [email or web address]  Contact for approvals/pending authorizations, clinical reviews, and discharge  PHYSICIAN ADVISORY SERVICES:  Medical Necessity Denial & [employer] Review  Phone: [phone]  Fax: [phone]  Email: [email or web address]            Initial Clinical Review    Admission: Date/Time/Statement:   Admission Orders (From admission, onward)     Ordered        02/18/23 2333  INPATIENT ADMISSION  Once                      Orders Placed This Encounter   Procedures   • INPATIENT ADMISSION     Standing Status:   Standing     Number of Occurrences:   1     Order Specific Question:   Level of Care     Answer:   Med Surg [16]     Order Specific Question:   Estimated length of stay     Answer:   More than 2 Midnights     Order Specific Question:   Certification     Answer:   I certify that inpatient services are medically necessary for this patient for a duration of greater than two midnights  See H&P and MD Progress Notes for additional information about the patient's course of treatment  ED Arrival Information     Expected   -    Arrival   2/18/2023 20:41    Acuity   Urgent            Means of arrival   Ambulance    Escorted by   Elizabeth ([address])    Service   Hospitalist    Admission type   Emergency            Arrival complaint   overdose            Chief Complaint   Patient presents with   • Overdose - Accidental     Pt presents  to the ED via EMS after an overdose  Pt was in a bar and was a witnessed fall  Pt states that he snorted something today that he thinks he took fentanyl  Pt given 2mg narcan IN and 4mg zofran IV  States that he remembers going into the bar but nothing afterwards  Obvious deformity noted to L ankle  Initial Presentation: 49 y o  male presents to ED via  EMS from home after a fall/accidental overdose  Follows  OP with methadone clinic,  Currently on   5  Mg daily, has been tapering down for the past  18 months  Felt increased pain/withdrawal  Over the past week and snorted fentanyl  [place]  Not  Remember events  Past  That  Thinks  He missed a 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  Pain  7/10  Additional PMH  Is  Tobacco abuse and SUSAN  Imaging  Shows dislocation  Left ankle, delayed healing left tibia fracture  Admit  Ip with  Accidental overdose, dislocation left ankle joint, closed fracture left tibia  Closed nondisplaced spiral fracture left fibula and plan is pain control, podiatry consult,  NWB and close monitoring  Podiatry consult  Surgery   Non emergent  Needs to be  NWB  Needs  Soft tissue envelope to calm down prior to surgical  Intervention  Plan OP   Intervention  Date:   2/19        Day 2:   Remains  NWB  Continue pain control  Needs  PT/OT        2/20      D/C  Home with Op  Surgery in  1 week    ED Triage Vitals   Temperature Pulse Respirations Blood Pressure SpO2 02/18/23 2043 02/18/23 2043 02/18/23 2043 02/18/23 2043 02/18/23 2043   97 7 °F (36 5 °C) 92 12 114/65 91 %      Temp Source Heart Rate Source Patient Position - Orthostatic VS BP Location FiO2 (%)   02/18/23 2043 02/18/23 2043 02/18/23 2043 02/18/23 2043 --   Oral Monitor Lying Left arm       Pain Score       02/19/23 0116       8          Wt Readings from Last 1 Encounters:   02/21/23 118 kg (260 lb 9 6 oz)     Additional Vital Signs:   97 2 °F (36 2 °C) Abnormal  109 Abnormal  17 148/106 Abnormal  117 95 % -- -- None (Room air) Lying    02/19/23 0710 98 3 °F (36 8 °C) 90 16 128/61 90 92 % -- -- -- Lying   02/19/23 0028 96 3 °F (35 7 °C) Abnormal  62 16 99/72 77 97 % 36 4 L/min Nasal cannula Lying   02/18/23 2043 97 7 °F (36 5 °C) 92 12 114/65 -- 91 % -- -- Nasal cannula Lying       Pertinent Labs/Diagnostic Test Results:   XR ankle 3+ vw left   Final Result by Azeem Davidson MD (02/19 1030)      Stable appearance to the distal fibular and posterior tibial malleolar fractures  No new abnormalities  Workstation performed: [id number]         XR ankle 3+ views LEFT   Final Result by Isabella Cordon MD (37/61 1965)      Improved alignment of acute distal fibular and posterior tibial fractures post casting            Workstation performed: [id number]         CT head without contrast   Final Result by Valerie Baron MD (02/19 0740)      No acute intracranial abnormality  Findings are consistent with the preliminary report from [facility] which was provided shortly after completion of the exam                Workstation performed: [id number]         XR ankle 3+ views LEFT   Final Result by Wallace Bacon MD (02/18 2307)      Fracture dislocation left ankle, as described above  Please see discussion  The images are available for clinical review  The study was marked in [facility] for immediate notification               Workstation performed: [id number]               Results from last 7 days   Lab Units 02/20/23  0734 02/19/23  0519 02/18/23  2115   WBC Thousand/uL 10 63* 15 63* 12 28*   HEMOGLOBIN g/dL 13 9 15 5 15 2   HEMATOCRIT % 41 9 46 5 44 9   PLATELETS Thousands/uL 200 229 216   NEUTROS ABS Thousands/µL  --   --  8 09*         Results from last 7 days   Lab Units 02/20/23  0734 02/19/23  0519 02/18/23  2115   SODIUM mmol/L 135 138 137   POTASSIUM mmol/L 3 9 5 6* 3 9   CHLORIDE mmol/L 98 103 104   CO2 mmol/L 30 26 23   ANION GAP mmol/L 7 9 10   BUN mg/dL 18 20 15   CREATININE mg/dL 0 81 1 56* 0 80   EGFR ml/min/1 73sq m 105 51 105   CALCIUM mg/dL 9 1 9 1 8 8             Results from last 7 days   Lab Units 02/20/23  0734 02/19/23  0519 02/18/23  2115   GLUCOSE RANDOM mg/dL 119* 115* 190*               Results from last 7 days   Lab Units 02/20/23  0734   PROCALCITONIN ng/ml 0 15           Results from last 7 days   Lab Units 02/20/23  0856   AMPH/METH  Negative   BARBITURATE UR  Negative   BENZODIAZEPINE UR  Negative   COCAINE UR  Negative   METHADONE URINE  Positive*   OPIATE UR  Negative   PCP UR  Negative   THC UR  Positive*             ED Treatment:   Medication Administration from 02/18/2023 2041 to 02/19/2023 0012       Date/Time Order Dose Route Action Comments     02/18/2023 2046 EST naloxone (FOR EMS ONLY) [facility]) 2 MG/2ML injection 2 mg 0 mg Does not apply Given to EMS --     02/18/2023 2052 EST ondansetron (FOR EMS ONLY) (ZOFRAN) 4 mg/2 mL injection 4 mg 0 mg Does not apply Given to EMS --     02/18/2023 2341 EST tetanus-diphtheria-acellular pertussis (BOOSTRIX) IM injection 0 5 mL 0 5 mL Intramuscular Given --          Present on Admission:  • (Resolved) Accidental overdose  • Closed fracture of distal end of left tibia  • (Resolved) Dislocation of left ankle joint  • (Resolved) SUSAN (obstructive sleep apnea)  • Opioid use disorder      Admitting Diagnosis: Overdose [T50 901A]  Closed nondisplaced spiral fracture of shaft of left fibula with delayed healing [S82 445G]  Accidental fentanyl overdose, initial encounter [facility]) [T40 411A]  Dislocation of left ankle joint, initial encounter [S93 05XA]  Closed fracture dislocation of left ankle, initial encounter [S82 892A]  Age/Sex: 49 y o  male  Admission Orders:  Scheduled Medications:  docusate sodium, 100 mg, Oral, BID  enoxaparin, 40 mg, Subcutaneous, Q24H [address]  nicotine, 1 patch, Transdermal, Daily      Continuous IV Infusions:  No current facility-administered medications for this encounter  PRN Meds:  acetaminophen, 650 mg, Oral, Q4H PRN  calcium carbonate, 500 mg, Oral, Daily PRN  ondansetron, 4 mg, Intravenous, Q6H PRN  oxyCODONE, 10 mg, Oral, Q4H PRN  oxyCODONE, 5 mg, Oral, Q4H PRN        IP CONSULT TO PODIATRY    Network Utilization Review Department  ATTENTION: Please call with any questions or concerns to [phone] and carefully listen to the prompts so that you are directed to the right person  All voicemails are confidential   Nguyễn Mancia all requests for admission clinical reviews, approved or denied determinations and any other requests to dedicated fax number below belonging to the campus where the patient is receiving treatment   List of dedicated fax numbers for the Facilities:  [address] DENIALS (Administrative/Medical Necessity) [phone]   [address] (Maternity/NICU/Pediatrics) [phone]   [address] [phone]   [facility] [phone]   [address] [address] [facility] [place] [facility] [phone]   [address] [facility] [phone]   [address] [phone]

## 2023-02-24 ENCOUNTER — HOSPITAL ENCOUNTER (EMERGENCY)
Facility: HOSPITAL | Age: 48
Discharge: HOME | End: 2023-02-24
Attending: STUDENT IN AN ORGANIZED HEALTH CARE EDUCATION/TRAINING PROGRAM

## 2023-02-24 VITALS
WEIGHT: 265.5 LBS | DIASTOLIC BLOOD PRESSURE: 84 MMHG | HEART RATE: 82 BPM | BODY MASS INDEX: 37.03 KG/M2 | SYSTOLIC BLOOD PRESSURE: 155 MMHG | RESPIRATION RATE: 18 BRPM | TEMPERATURE: 98.3 F | OXYGEN SATURATION: 97 %

## 2023-02-24 DIAGNOSIS — M25.572 ACUTE LEFT ANKLE PAIN: Primary | ICD-10-CM

## 2023-02-24 LAB
DME PARACHUTE DELIVERY DATE ACTUAL: NORMAL
DME PARACHUTE DELIVERY DATE EXPECTED: NORMAL
DME PARACHUTE DELIVERY DATE REQUESTED: NORMAL
DME PARACHUTE DELIVERY NOTE: NORMAL
DME PARACHUTE ITEM DESCRIPTION: NORMAL
DME PARACHUTE ORDER STATUS: NORMAL
DME PARACHUTE SUPPLIER NAME: NORMAL
DME PARACHUTE SUPPLIER PHONE: NORMAL

## 2023-02-24 RX ORDER — ACETAMINOPHEN 325 MG/1
650 TABLET ORAL ONCE
Status: COMPLETED | OUTPATIENT
Start: 2023-02-24 | End: 2023-02-24

## 2023-02-24 RX ORDER — ACETAMINOPHEN 500 MG
500 TABLET ORAL EVERY 6 HOURS PRN
Qty: 30 TABLET | Refills: 0 | Status: SHIPPED | OUTPATIENT
Start: 2023-02-24

## 2023-02-24 RX ORDER — NAPROXEN 500 MG/1
500 TABLET ORAL 2 TIMES DAILY WITH MEALS
Qty: 30 TABLET | Refills: 0 | Status: SHIPPED | OUTPATIENT
Start: 2023-02-24

## 2023-02-24 RX ORDER — KETOROLAC TROMETHAMINE 30 MG/ML
15 INJECTION, SOLUTION INTRAMUSCULAR; INTRAVENOUS ONCE
Status: COMPLETED | OUTPATIENT
Start: 2023-02-24 | End: 2023-02-24

## 2023-02-24 RX ADMIN — KETOROLAC TROMETHAMINE 15 MG: 30 INJECTION, SOLUTION INTRAMUSCULAR; INTRAVENOUS at 05:15

## 2023-02-24 RX ADMIN — ACETAMINOPHEN 650 MG: 325 TABLET ORAL at 05:16

## 2023-02-24 NOTE — DISCHARGE INSTRUCTIONS
Take medications as directed  Do not take motrin with Naprosyn  Return for new or worsening complaints including fever, worsening pain or swelling   Follow up with  orthopedics at your appointment Tuesday

## 2023-02-24 NOTE — ED PROVIDER NOTES
History  Chief Complaint   Patient presents with   • Leg Pain     Patient with left leg fracture 6 days ago  Splinted by ortho - now with throbbing pain to his lower leg  Was given 5 - 5mg percocets which he used and has used ibuprofen q 4-6 hours since with relief but tonight the pain is not going away  44-year-old male with history of recent left bimalleolar fracture presents via EMS complaining of worsening pain  Patient states that he was initially prescribed a short-term course of oxycodone however ran out and has been taking Motrin and Tylenol at home with moderate relief  Patient states that he took these medications tonight however at 8 PM his pain became worse and he was unable to tolerate it  States that his ankle is swollen but feels like it is improving  Was seen by orthopedic specialist while admitted and had a cast placed and has follow-up this week  Denies any other complaints       History provided by:  Patient   used: No        Prior to Admission Medications   Prescriptions Last Dose Informant Patient Reported? Taking?   nicotine (NICODERM CQ) 21 mg/24 hr TD 24 hr patch   No No   Sig: Place 1 patch on the skin over 24 hours daily Do not start before February 21, 2023  Facility-Administered Medications: None       Past Medical History:   Diagnosis Date   • Accidental overdose 2/18/2023   • Kidney stone    • Opiate abuse, continuous (HCC)    • SUSAN (obstructive sleep apnea)    • Subdural hematoma        Past Surgical History:   Procedure Laterality Date   • BRAIN HEMATOMA EVACUATION     • ORIF ELBOW FRACTURE Left        Family History   Problem Relation Age of Onset   • No Known Problems Mother    • No Known Problems Father      I have reviewed and agree with the history as documented      E-Cigarette/Vaping     E-Cigarette/Vaping Substances     Social History     Tobacco Use   • Smoking status: Every Day     Packs/day: 1 00     Types: Cigarettes   • Smokeless tobacco: Never   Substance Use Topics   • Alcohol use: Not Currently   • Drug use: Yes     Types: Fentanyl       Review of Systems   Constitutional: Negative  Negative for chills and fatigue  HENT: Negative for ear pain and sore throat  Eyes: Negative for photophobia and redness  Respiratory: Negative for apnea, cough and shortness of breath  Cardiovascular: Negative for chest pain  Gastrointestinal: Negative for abdominal pain, nausea and vomiting  Genitourinary: Negative for dysuria  Musculoskeletal: Positive for arthralgias (L ankle pain)  Negative for neck pain and neck stiffness  Skin: Negative for rash  Neurological: Negative for dizziness, tremors, syncope and weakness  Psychiatric/Behavioral: Negative for suicidal ideas  Physical Exam  Physical Exam  Constitutional:       General: He is not in acute distress  Appearance: He is well-developed  He is not diaphoretic  Eyes:      Pupils: Pupils are equal, round, and reactive to light  Cardiovascular:      Rate and Rhythm: Normal rate and regular rhythm  Pulmonary:      Effort: Pulmonary effort is normal  No respiratory distress  Breath sounds: Normal breath sounds  Abdominal:      General: Bowel sounds are normal  There is no distension  Palpations: Abdomen is soft  Musculoskeletal:         General: Normal range of motion  Cervical back: Normal range of motion and neck supple  Comments: Left ankle in soft cast, cap refill less than 2 seconds, sensation intact, range of motion intact, dorsalis pedis pulse 2+, posterior and anterior lower extremity with soft nonrigid nontender compartments   Skin:     General: Skin is warm and dry  Neurological:      Mental Status: He is alert and oriented to person, place, and time           Vital Signs  ED Triage Vitals   Temperature Pulse Respirations Blood Pressure SpO2   02/24/23 0457 02/24/23 0457 02/24/23 0457 02/24/23 0457 02/24/23 0457   98 3 °F (36 8 °C) 82 18 155/84 97 %      Temp Source Heart Rate Source Patient Position - Orthostatic VS BP Location FiO2 (%)   02/24/23 0457 02/24/23 0457 02/24/23 0457 02/24/23 0457 --   Oral Monitor Sitting Left arm       Pain Score       02/24/23 0515       9           Vitals:    02/24/23 0457   BP: 155/84   Pulse: 82   Patient Position - Orthostatic VS: Sitting         Visual Acuity      ED Medications  Medications   ketorolac (TORADOL) injection 15 mg (15 mg Intramuscular Given 2/24/23 0515)   acetaminophen (TYLENOL) tablet 650 mg (650 mg Oral Given 2/24/23 0516)       Diagnostic Studies  Results Reviewed     None                 No orders to display              Procedures  Procedures         ED Course                                             Medical Decision Making  Upon initial presentation patient complained of continued left ankle pain with mild swelling  Initial differential was normal course for bimalleolar fracture versus coagulopathy versus compartment syndrome  On exam patient was found to have intact pulses with cap refill less than 2 seconds without paralysis paresthesias pain out of proportion or poikilothermia, low concern for compartment syndrome at this time  Patient had negative posterior calf pain with swelling less than 3cm greater than unaffected extremity, low concern for coagulopathy at this time  Discussed with attending  Patient already completed initial course of narcotic medication, given high risk with prior opioid abuse plan to treat conservatively with nonopioid medications  Toradol injection administered in the emergency department and patient was educated on appropriate anti-inflammatory care at home  Patient has follow-up with orthopedics on Tuesday and was given strict return precautions, demonstrates understanding  Risk  OTC drugs  Prescription drug management            Disposition  Final diagnoses:   Acute left ankle pain     Time reflects when diagnosis was documented in both MDM as applicable and the Disposition within this note     Time User Action Codes Description Comment    2/24/2023  5:22 AM Christian Sanders Add [M25 572] Acute left ankle pain       ED Disposition     ED Disposition   Discharge    Condition   Stable    Date/Time   Fri Feb 24, 2023  5:22 AM    Comment   Yovany Conleyy discharge to home/self care  Follow-up Information     Follow up With Specialties Details Why Contact Info Additional [address] [facility] Emergency Department Emergency Medicine Go to  If symptoms worsen [address] [phone]  [address] [place] [address] Emergency Department          Patient's Medications   Discharge Prescriptions    ACETAMINOPHEN (TYLENOL) 500 MG TABLET    Take 1 tablet (500 mg total) by mouth every 6 (six) hours as needed for moderate pain       Start Date: 2/24/2023 End Date: --       Order Dose: 500 mg       Quantity: 30 tablet    Refills: 0    NAPROXEN (NAPROSYN) 500 MG TABLET    Take 1 tablet (500 mg total) by mouth 2 (two) times a day with meals       Start Date: 2/24/2023 End Date: --       Order Dose: 500 mg       Quantity: 30 tablet    Refills: 0       No discharge procedures on file      PDMP Review       Value Time User    PDMP Reviewed  Yes 2/18/2023 11:51 PM Emilee Gay PA-C          ED Provider  Electronically Signed by           Otf Jones PA-C  02/24/23 2250

## 2023-03-02 ENCOUNTER — ANESTHESIA EVENT (OUTPATIENT)
Dept: PERIOP | Facility: HOSPITAL | Age: 48
End: 2023-03-02

## 2023-03-02 NOTE — PRE-PROCEDURE INSTRUCTIONS
Pre-Surgery Instructions:   Medication Instructions   • acetaminophen (TYLENOL) 500 mg tablet Uses PRN- OK to take day of surgery   • naproxen (Naprosyn) 500 mg tablet Stop taking 5 days prior to surgery  per surgeon   • nicotine (NICODERM CQ) 21 mg/24 hr TD 24 hr patch Take day of surgery  • NON FORMULARY Stop taking 7 days prior to surgery  Pre op instructions per My Surgical Experience booklet,medications per anesthesia guidelines and showering instructions per Navneet Cabezas protocol reviewed-Patient has CHG  Pt  Verbalized understanding of current visitor restrictions  Instructed to call surgeon with any illness,change in health or covid exposure now till DOS  All medications instructed to take DOS are with sips water only  Instructed to avoid all ASA and OTC Vit/Supp 1 week prior to surgery and to avoid NSAIDs 3 days prior to surgery per anesthesia instructions  Tylenol ok to take prn  Pt  Verbalized an understanding of all instructions reviewed and offers no concerns at this time

## 2023-03-03 NOTE — ANESTHESIA PREPROCEDURE EVALUATION
Procedure:  BIMALLEOLAR ORIF OF ANKLE (Left: Ankle)    Relevant Problems   Musculoskeletal and Integument   (+) Closed fracture of distal end of left tibia   (+) Closed nondisplaced spiral fracture of shaft of left fibula with delayed healing      Other   (+) Opioid use disorder   (+) Preop exam for internal medicine        Hx opioid abuse (fentanyl)  Has taken oxycodone in the past for pain  Was on methadone, but stopped on 2/3/23  Admits to current CBD use  Physical Exam    Airway    Mallampati score: III  TM Distance: >3 FB  Neck ROM: full     Dental   upper dentures,     Cardiovascular  Cardiovascular exam normal    Pulmonary  Pulmonary exam normal     Other Findings        Anesthesia Plan  ASA Score- 2     Anesthesia Type- regional with ASA Monitors  Additional Monitors:   Airway Plan: ETT and LMA  Plan Factors-Exercise tolerance (METS): >4 METS  Chart reviewed  Existing labs reviewed  Patient is not a current smoker  Patient not instructed to abstain from smoking on day of procedure  Patient did not smoke on day of surgery  Induction- intravenous  Postoperative Plan-     Informed Consent- Anesthetic plan and risks discussed with patient  I personally reviewed this patient with the CRNA  Discussed and agreed on the Anesthesia Plan with the CRNA  Shellie Miller

## 2023-03-08 ENCOUNTER — ANESTHESIA (OUTPATIENT)
Dept: PERIOP | Facility: HOSPITAL | Age: 48
End: 2023-03-08

## 2023-03-08 ENCOUNTER — HOSPITAL ENCOUNTER (OUTPATIENT)
Facility: HOSPITAL | Age: 48
Setting detail: OUTPATIENT SURGERY
Discharge: HOME | End: 2023-03-08
Attending: PODIATRIST | Admitting: PODIATRIST

## 2023-03-08 ENCOUNTER — APPOINTMENT (OUTPATIENT)
Dept: RADIOLOGY | Facility: HOSPITAL | Age: 48
End: 2023-03-08

## 2023-03-08 VITALS
HEART RATE: 97 BPM | OXYGEN SATURATION: 95 % | DIASTOLIC BLOOD PRESSURE: 78 MMHG | SYSTOLIC BLOOD PRESSURE: 167 MMHG | BODY MASS INDEX: 37.1 KG/M2 | RESPIRATION RATE: 20 BRPM | WEIGHT: 265 LBS | HEIGHT: 71 IN | TEMPERATURE: 97 F

## 2023-03-08 DIAGNOSIS — Z98.890 POST-OPERATIVE STATE: Primary | ICD-10-CM

## 2023-03-08 DEVICE — TUBULAR PLATE
Type: IMPLANTABLE DEVICE | Site: ANKLE | Status: FUNCTIONAL
Brand: ORTHOLOC 3DI PLATING SYSTEM

## 2023-03-08 DEVICE — IMPLANTABLE DEVICE
Type: IMPLANTABLE DEVICE | Site: ANKLE | Status: FUNCTIONAL
Brand: ORTHOLOC 3DI

## 2023-03-08 DEVICE — CORTICAL SCREW
Type: IMPLANTABLE DEVICE | Site: ANKLE | Status: FUNCTIONAL
Brand: ORTHOLOC

## 2023-03-08 DEVICE — IMPLANTABLE DEVICE
Type: IMPLANTABLE DEVICE | Site: ANKLE | Status: FUNCTIONAL
Brand: GRAVITY SYNCHFIX

## 2023-03-08 DEVICE — BIOACTIVE BONE GRAFT SUBSTITUTE, FOAM PACK; BETA-TRICALCIUM PHOSPHATE, TYPE I BOVINE COLLAGEN, AND BIOACTIVE GLASS
Type: IMPLANTABLE DEVICE | Site: ANKLE | Status: FUNCTIONAL
Brand: VITOSS BA

## 2023-03-08 RX ORDER — CHLORHEXIDINE GLUCONATE 0.12 MG/ML
15 RINSE ORAL ONCE
Status: COMPLETED | OUTPATIENT
Start: 2023-03-08 | End: 2023-03-08

## 2023-03-08 RX ORDER — DEXAMETHASONE SODIUM PHOSPHATE 10 MG/ML
INJECTION, SOLUTION INTRAMUSCULAR; INTRAVENOUS AS NEEDED
Status: DISCONTINUED | OUTPATIENT
Start: 2023-03-08 | End: 2023-03-08

## 2023-03-08 RX ORDER — CEFAZOLIN SODIUM 2 G/50ML
2000 SOLUTION INTRAVENOUS ONCE
Status: COMPLETED | OUTPATIENT
Start: 2023-03-08 | End: 2023-03-08

## 2023-03-08 RX ORDER — ONDANSETRON 2 MG/ML
4 INJECTION INTRAMUSCULAR; INTRAVENOUS ONCE AS NEEDED
Status: DISCONTINUED | OUTPATIENT
Start: 2023-03-08 | End: 2023-03-08 | Stop reason: HOSPADM

## 2023-03-08 RX ORDER — MIDAZOLAM HYDROCHLORIDE 2 MG/2ML
INJECTION, SOLUTION INTRAMUSCULAR; INTRAVENOUS
Status: COMPLETED | OUTPATIENT
Start: 2023-03-08 | End: 2023-03-08

## 2023-03-08 RX ORDER — BUPIVACAINE HYDROCHLORIDE 5 MG/ML
INJECTION, SOLUTION PERINEURAL
Status: COMPLETED | OUTPATIENT
Start: 2023-03-08 | End: 2023-03-08

## 2023-03-08 RX ORDER — ALBUTEROL SULFATE 2.5 MG/3ML
2.5 SOLUTION RESPIRATORY (INHALATION) ONCE AS NEEDED
Status: DISCONTINUED | OUTPATIENT
Start: 2023-03-08 | End: 2023-03-08 | Stop reason: HOSPADM

## 2023-03-08 RX ORDER — LIDOCAINE HYDROCHLORIDE 10 MG/ML
INJECTION, SOLUTION EPIDURAL; INFILTRATION; INTRACAUDAL; PERINEURAL AS NEEDED
Status: DISCONTINUED | OUTPATIENT
Start: 2023-03-08 | End: 2023-03-08

## 2023-03-08 RX ORDER — MAGNESIUM HYDROXIDE 1200 MG/15ML
LIQUID ORAL AS NEEDED
Status: DISCONTINUED | OUTPATIENT
Start: 2023-03-08 | End: 2023-03-08 | Stop reason: HOSPADM

## 2023-03-08 RX ORDER — ONDANSETRON 2 MG/ML
INJECTION INTRAMUSCULAR; INTRAVENOUS AS NEEDED
Status: DISCONTINUED | OUTPATIENT
Start: 2023-03-08 | End: 2023-03-08

## 2023-03-08 RX ORDER — PROPOFOL 10 MG/ML
INJECTION, EMULSION INTRAVENOUS AS NEEDED
Status: DISCONTINUED | OUTPATIENT
Start: 2023-03-08 | End: 2023-03-08

## 2023-03-08 RX ORDER — FENTANYL CITRATE/PF 50 MCG/ML
25 SYRINGE (ML) INJECTION
Status: COMPLETED | OUTPATIENT
Start: 2023-03-08 | End: 2023-03-08

## 2023-03-08 RX ORDER — LIDOCAINE HYDROCHLORIDE 10 MG/ML
INJECTION, SOLUTION EPIDURAL; INFILTRATION; INTRACAUDAL; PERINEURAL
Status: COMPLETED | OUTPATIENT
Start: 2023-03-08 | End: 2023-03-08

## 2023-03-08 RX ORDER — KETAMINE HCL IN NACL, ISO-OSM 100MG/10ML
SYRINGE (ML) INJECTION AS NEEDED
Status: DISCONTINUED | OUTPATIENT
Start: 2023-03-08 | End: 2023-03-08

## 2023-03-08 RX ORDER — OXYCODONE HYDROCHLORIDE AND ACETAMINOPHEN 5; 325 MG/1; MG/1
1 TABLET ORAL EVERY 4 HOURS PRN
Status: DISCONTINUED | OUTPATIENT
Start: 2023-03-08 | End: 2023-03-08 | Stop reason: HOSPADM

## 2023-03-08 RX ORDER — KETOROLAC TROMETHAMINE 30 MG/ML
INJECTION, SOLUTION INTRAMUSCULAR; INTRAVENOUS AS NEEDED
Status: DISCONTINUED | OUTPATIENT
Start: 2023-03-08 | End: 2023-03-08

## 2023-03-08 RX ORDER — FENTANYL CITRATE 50 UG/ML
INJECTION, SOLUTION INTRAMUSCULAR; INTRAVENOUS
Status: COMPLETED | OUTPATIENT
Start: 2023-03-08 | End: 2023-03-08

## 2023-03-08 RX ORDER — CEFDINIR 300 MG/1
300 CAPSULE ORAL EVERY 12 HOURS SCHEDULED
Qty: 10 CAPSULE | Refills: 0 | Status: SHIPPED | OUTPATIENT
Start: 2023-03-08 | End: 2023-03-13

## 2023-03-08 RX ORDER — HYDROMORPHONE HCL/PF 1 MG/ML
0.5 SYRINGE (ML) INJECTION
Status: DISCONTINUED | OUTPATIENT
Start: 2023-03-08 | End: 2023-03-08 | Stop reason: HOSPADM

## 2023-03-08 RX ORDER — SODIUM CHLORIDE, SODIUM LACTATE, POTASSIUM CHLORIDE, CALCIUM CHLORIDE 600; 310; 30; 20 MG/100ML; MG/100ML; MG/100ML; MG/100ML
125 INJECTION, SOLUTION INTRAVENOUS CONTINUOUS
Status: DISCONTINUED | OUTPATIENT
Start: 2023-03-08 | End: 2023-03-08 | Stop reason: HOSPADM

## 2023-03-08 RX ADMIN — BUPIVACAINE HYDROCHLORIDE 30 ML: 5 INJECTION, SOLUTION PERINEURAL at 07:38

## 2023-03-08 RX ADMIN — PROPOFOL 40 MG: 10 INJECTION, EMULSION INTRAVENOUS at 08:04

## 2023-03-08 RX ADMIN — FENTANYL CITRATE 25 MCG: 50 INJECTION, SOLUTION INTRAMUSCULAR; INTRAVENOUS at 10:07

## 2023-03-08 RX ADMIN — CEFAZOLIN SODIUM 2000 MG: 2 SOLUTION INTRAVENOUS at 07:42

## 2023-03-08 RX ADMIN — ONDANSETRON 4 MG: 2 INJECTION INTRAMUSCULAR; INTRAVENOUS at 09:03

## 2023-03-08 RX ADMIN — DEXAMETHASONE SODIUM PHOSPHATE 10 MG: 10 INJECTION, SOLUTION INTRAMUSCULAR; INTRAVENOUS at 07:50

## 2023-03-08 RX ADMIN — FENTANYL CITRATE 25 MCG: 50 INJECTION, SOLUTION INTRAMUSCULAR; INTRAVENOUS at 10:12

## 2023-03-08 RX ADMIN — PROPOFOL 300 MG: 10 INJECTION, EMULSION INTRAVENOUS at 07:46

## 2023-03-08 RX ADMIN — MIDAZOLAM 2 MG: 1 INJECTION INTRAMUSCULAR; INTRAVENOUS at 07:14

## 2023-03-08 RX ADMIN — FENTANYL CITRATE 25 MCG: 50 INJECTION, SOLUTION INTRAMUSCULAR; INTRAVENOUS at 10:22

## 2023-03-08 RX ADMIN — CHLORHEXIDINE GLUCONATE 0.12% ORAL RINSE 15 ML: 1.2 LIQUID ORAL at 06:36

## 2023-03-08 RX ADMIN — LIDOCAINE HYDROCHLORIDE 5 ML: 10 INJECTION, SOLUTION EPIDURAL; INFILTRATION; INTRACAUDAL; PERINEURAL at 07:18

## 2023-03-08 RX ADMIN — FENTANYL CITRATE 100 MCG: 50 INJECTION, SOLUTION INTRAMUSCULAR; INTRAVENOUS at 07:14

## 2023-03-08 RX ADMIN — FENTANYL CITRATE 25 MCG: 50 INJECTION, SOLUTION INTRAMUSCULAR; INTRAVENOUS at 10:17

## 2023-03-08 RX ADMIN — KETOROLAC TROMETHAMINE 30 MG: 30 INJECTION, SOLUTION INTRAMUSCULAR; INTRAVENOUS at 09:13

## 2023-03-08 RX ADMIN — LIDOCAINE HYDROCHLORIDE 50 MG: 10 INJECTION, SOLUTION EPIDURAL; INFILTRATION; INTRACAUDAL at 07:46

## 2023-03-08 RX ADMIN — SODIUM CHLORIDE, SODIUM LACTATE, POTASSIUM CHLORIDE, AND CALCIUM CHLORIDE 125 ML/HR: .6; .31; .03; .02 INJECTION, SOLUTION INTRAVENOUS at 06:44

## 2023-03-08 RX ADMIN — OXYCODONE HYDROCHLORIDE AND ACETAMINOPHEN 1 TABLET: 5; 325 TABLET ORAL at 10:42

## 2023-03-08 RX ADMIN — Medication 30 MG: at 07:56

## 2023-03-08 NOTE — ANESTHESIA POSTPROCEDURE EVALUATION
Post-Op Assessment Note    CV Status:  Stable  Pain Score: 0    Pain management: satisfactory to patient     Mental Status:  Awake, sleepy and arousable   Hydration Status:  Euvolemic   PONV Controlled:  Controlled   Airway Patency:  Patent   Two or more mitigation strategies used for obstructive sleep apnea   Post Op Vitals Reviewed: Yes      Staff: CRNA, Anesthesiologist         No notable events documented      /93 (03/08/23 0944)    Temp 99 4 °F (37 4 °C) (03/08/23 0944)    Pulse (!) 107 (03/08/23 0944)   Resp 20 (03/08/23 0944)    SpO2 94 % (03/08/23 0944)

## 2023-03-08 NOTE — PERIOPERATIVE NURSING NOTE
Pt has edema and erythema above dressings almost to knee   Feels warm to touch and tight   Anesthesia aware

## 2023-03-08 NOTE — DISCHARGE INSTR - AVS FIRST PAGE
Dr Mckenzie Precise  Post-Operative Instructions    Pain / Swelling  There is expected to be some discomfort, swelling and bruising of the foot  You might see some blood on the bandage  This is not a cause for alarm  However, if there is active or persistent bleeding (blood running out of the bandage while at rest) - call the office at once  Apply an ice bag behind left knee for 30 minutes for each waking hour, for the first 72 hours  This should be discontinued when sleeping  This will also work through your cast if you have one  Ice must not leak and wet the dressings  Also, using the ice inappropriately can cause permanent nerve damage and frostbite  Your foot should be elevated as much as possible for the first 72 hours  The foot should be above heart level  If your foot is below heart level, throbbing and pain will increase  When sleeping, elevation can be accomplished by putting a small hard suitcase between the box spring and mattress at the foot of the bed  Walking and standing will increase pain, throbbing and bleeding  Persistent pain despite elevation and your pain meds can many times be relieved by removing the tight brown compression layer (called the ACE wrap) that is over the white gauze dressing  If you are elevating and taking your pain meds and pain is still severe, remove this brown stretchy layer but leave the gauze intact  Wait 30 minutes  If the pain subsides, reapply the ACE so it’s not so tight  If pain doesn’t get better, call your doctor  Dressings / Casts  Do not remove your surgical dressings - they will be changed at your doctor appointment  Do not allow surgical dressings to get wet  Sponge baths should be used until the sutures are removed  Do not try to keep the foot dry using a garbage bag and tape - this rarely works  If you get your dressings or cast wet - call your doctor immediately  If your cast or dressings feel tight - elevate your foot for 30 minutes   If this doesn’t help and you feel tingling or see toe discoloration - call your doctor  Do not put things in your cast such as powder, coat hangers to scratch, etc  This can cause skin damage and infections  Infection  If you have a fever at or above 100 degrees, chills, sweats, or notice red streaks rising above the dressing or smell odor / see pus (creamy white drainage), call your doctor immediately  Constipation  If you have severe constipation after surgery, this can be due to the pain medication  Notify your doctor and special medication will be prescribed to deal with this  Blood Clots  If you had surgery and are in a cast, have an external fixation device, or are non-weightbearing using crutches, a knee scooter, a wheelchair, a walker, or an iWalk device - you need to be on a blood thinner  Your doctor will prescribe one of the regimens below  If you run out of the blood thinner checked off below before you are walking normally on your foot and out of your cast - notify your doctor immediately so you can get a refill  Not doing so can lead to blood clots and serious complications including death  Aspirin 325mg daily  Numbness  It is normal for your foot to be numb until about dinner time  If you’ve had a popliteal block procedure, you might be numb until the following day  When you start to feel pins and needles in the foot - this means the block is wearing off  That is the appropriate time to take your pain medication  Pain Medication  Take your post-operative pain medication as directed by your surgeon  Do not supplement your pain medication with over the counter drugs, old leftover pain medications, or extra Tylenol  You must discuss any additional medications with your doctor prior to taking them for pain  Driving  No driving is allowed without discussion with the doctor  Ambulation  Nonweightbearing to surgical foot  If given a flat, stiff shoe / darco wedge shoe, Do not walk at all without it    Use a device (cane, walker, crutches) to take some weight off of the foot when walking  If instructed not to put weight on the surgical foot, use the following:  Crutches     Putting weight on the foot will lead to complications

## 2023-03-08 NOTE — OP NOTE
OPERATIVE REPORT - Podiatry  PATIENT NAME: German Savage    :  1975  MRN: [id number]  Pt Location: [address] [place] [address] 12    SURGERY DATE: 3/8/2023    Surgeon(s) and Role:     * Rosa Cruz, DPM - Primary     * Camron Leone DPM - Assisting     * Kehinde Lozada, RAPHAEL - Assisting    Pre-op Diagnosis:  Other fracture of left lower leg, initial encounter for closed fracture S82 892A    Post-Op Diagnosis Codes:     * Other fracture of left lower leg, initial encounter for closed fracture [S82 892A]    Procedure(s) (LRB):  BIMALLEOLAR ORIF OF ANKLE (Left)    Specimen(s):  * No specimens in log *    Estimated Blood Loss:   Minimal    Drains:  * No LDAs found *    Anesthesia Type:   General w/ Popliteal Block     Hemostasis:  Pneumatic thigh tourniquet, anatomic dissection, electrocautery    Materials:  Implant Name Type Inv  Item Serial No   Lot No  LRB No  Used Action   3 5 X 18MM CORTICAL SCREW     [employer]  Left 1 Implanted   7 HOLE STRAIGHT TUBULAR PLATE     [employer]  Left 1 Implanted   VITOSS BA 5ML FOAM PACK - [id number]  VITOSS BA 5ML FOAM PACK  [employer] [id number] Left 1 Implanted   SCREW LCK 3 5 X 14MM FLLY THRD - [id number]  SCREW LCK 3 5 X 14MM FLLY THRD  [employer]  Left 2 Implanted   SCREW LCK 3 5 X 12MM FLLY THRD - [id number]  SCREW LCK 3 5 X 12MM FLLY THRD  [employer]  Left 3 Implanted   GRAVITY SYNCHFIX SUTURE #5    [employer] [id number] Left 1 Implanted     Vicryl and nylon closure  Operative Findings:  Consistent with diagnosis    Complications:   None    Procedure and Technique:     Under mild sedation, the patient was brought into the operating room and placed on the operating room table in the supine position  IV sedation was achieved by anesthesia team and a universal timeout was performed where all parties are in agreement of correct patient, correct procedure and correct site   A pneumatic tourniquet was then placed over the patient's left lower extremity with ample padding  The foot was then prepped and draped in the usual aseptic manner  An esmarch bandage was used to exsangunate the foot and the pneumatic tourniquet was then inflated to 325mmHg  Attention was directed to the lateral left ankle  An incision was made through skin and subcutaneous tissue overlying the fibula  This incision was then deepened to bone and at the distal aspect  Periosteal and soft tissue structures were then freed distal to proximal utilizing a key elevator  Vital neurovascular structures were then retracted from the surgical field  The fracture was visualized and cleaned utilizing a rongeur and dental pick  The ankle was manually reduced and reduction was held utilizing a fracture reduction clamp  Excellent reduction and restoration of fibular length were confirmed on multiple planes and C arm  A lag screw was then drilled, measured, and inserted perpendicular to the fracture with proper AO technique (see implants)  Proper screw placement was confirmed on multiple planes and C arm  The fracture reduction clamp was removed and the fracture was noted to be excellently reduced  At this time a 7 hole plate was then placed on the lateral fibula and temporarily fixated utilizing BB tacks  Proper hardware placement was confirmed on C-arm in multiple planes  Holes of the plate were then drilled, measured, filled per proper AO technique with locking screws (see implants)  Proper hardware placement was once again confirmed on fluoroscopy in multiple planes  The ankle was then stressed under fluoroscopy and there is noted to be increased medial clear space, indicating syndesmotic injury  Through one of the holes of the plate the Nikita syndesmotic fixation (see implants) was drilled, inserted per proper manufacture technique with a medial incision made to allow for the suture button to sit flush to bone    The ankle was then stressed again noted to be stable with no increase in medial clear space indicating successful syndesmotic repair  The surgical incision was irrigated with copious amounts of normal sterile saline  The fracture line was then packed with vitoss  The periosteal and capsular structures were reapproximated using Vicryl  Subcutaneous closure was obtained utilizing Vicryl  Skin edges were reapproximated and closure was obtained utilizing Nylon  The foot was then cleansed and dried  The incision site was dressed with Xeroform and a dry sterile dressing with well-padded posterior splint  The tourniquet was deflated and normal hyperemic flush was noted to all digits  The patient tolerated the procedure and anesthesia well and was transported to the PACU with vital signs stable  Dr Larry Byrnes was present during the entire procedure and participated in all key aspects  SIGNATURE: Kaelyn Littlejohn DPM  DATE: March 8, 2023  TIME: 9:43 AM      Portions of the record may have been created with voice recognition software  Occasional wrong word or "sound a like" substitutions may have occurred due to the inherent limitations of voice recognition software  Read the chart carefully and recognize, using context, where substitutions have occurred

## 2023-03-08 NOTE — ANESTHESIA PROCEDURE NOTES
Peripheral Block    Patient location during procedure: pre-op  Start time: 3/8/2023 7:14 AM  Reason for block: at surgeon's request and post-op pain management  Staffing  Performed: Anesthesiologist   Anesthesiologist: Sarah Buenrostro MD  Preanesthetic Checklist  Completed: patient identified, IV checked, site marked, risks and benefits discussed, surgical consent, monitors and equipment checked, pre-op evaluation and timeout performed  Peripheral Block  Patient position: right lateral decubitus  Prep: ChloraPrep  Patient monitoring: heart rate, cardiac monitor, continuous pulse ox and frequent blood pressure checks  Block type: popliteal  Laterality: left  Injection technique: single-shot  Procedures: ultrasound guided, Ultrasound guidance required for the procedure to increase accuracy and safety of medication placement and decrease risk of complications    Ultrasound permanent image savedbupivacaine (MARCAINE) 0 5 % - Perineural   30 mL - 3/8/2023 7:38:00 AM  midazolam (VERSED) 2 mg/2 mL - Intravenous   2 mg - 3/8/2023 7:14:00 AM  fentaNYL 50 mcg/mL - Intravenous   100 mcg - 3/8/2023 7:14:00 AM  lidocaine (PF) (XYLOCAINE-MPF) 1 % - Infiltration   5 mL - 3/8/2023 7:18:00 AM  Needle  Needle type: Stimuplex   Needle length: 4 in  Needle localization: ultrasound guidance  Needle insertion depth: 8 cm  Assessment  Injection assessment: incremental injection, local visualized surrounding nerve on ultrasound, negative aspiration for heme and transient paresthesias  Paresthesia pain: immediately resolved  Heart rate change: no  Slow fractionated injection: no  Post-procedure:  site cleaned  patient tolerated the procedure well with no immediate complications

## 2023-03-28 ENCOUNTER — HOSPITAL ENCOUNTER (OUTPATIENT)
Dept: RADIOLOGY | Facility: IMAGING CENTER | Age: 48
Discharge: HOME | End: 2023-03-28

## 2023-03-28 DIAGNOSIS — S82.892A CLOSED FRACTURE OF LEFT ANKLE, INITIAL ENCOUNTER: ICD-10-CM

## 2023-05-02 ENCOUNTER — HOSPITAL ENCOUNTER (OUTPATIENT)
Dept: RADIOLOGY | Facility: IMAGING CENTER | Age: 48
Discharge: HOME | End: 2023-05-02

## 2023-05-02 DIAGNOSIS — S82.892D CLOSED FRACTURE OF LEFT ANKLE WITH ROUTINE HEALING, SUBSEQUENT ENCOUNTER: ICD-10-CM

## 2023-05-04 ENCOUNTER — OFFICE VISIT (OUTPATIENT)
Dept: INTERNAL MEDICINE CLINIC | Facility: OTHER | Age: 48
End: 2023-05-04

## 2023-05-04 VITALS
DIASTOLIC BLOOD PRESSURE: 80 MMHG | OXYGEN SATURATION: 98 % | HEIGHT: 71 IN | TEMPERATURE: 98.1 F | HEART RATE: 96 BPM | WEIGHT: 258 LBS | BODY MASS INDEX: 36.12 KG/M2 | SYSTOLIC BLOOD PRESSURE: 138 MMHG | RESPIRATION RATE: 18 BRPM

## 2023-05-04 DIAGNOSIS — S82.445G: ICD-10-CM

## 2023-05-04 DIAGNOSIS — Z01.818 PREOP EXAM FOR INTERNAL MEDICINE: Primary | ICD-10-CM

## 2023-05-04 DIAGNOSIS — S82.872D CLOSED DISPLACED PILON FRACTURE OF LEFT TIBIA WITH ROUTINE HEALING, SUBSEQUENT ENCOUNTER: ICD-10-CM

## 2023-05-04 PROBLEM — F11.90 OPIOID USE DISORDER: Status: RESOLVED | Noted: 2023-02-18 | Resolved: 2023-05-04

## 2023-05-04 RX ORDER — GABAPENTIN 100 MG/1
CAPSULE ORAL
COMMUNITY
Start: 2023-03-06 | End: 2023-05-04

## 2023-05-04 RX ORDER — IBUPROFEN 600 MG/1
600 TABLET ORAL EVERY 6 HOURS
COMMUNITY
Start: 2023-03-06 | End: 2023-05-04

## 2023-05-04 NOTE — PROGRESS NOTES
Subjective:  Chief Complaint   Patient presents with    Pre-op Exam      Left foot surgery PA foot and ankle  5/10/2023, Dr Tania Foss is a 49y o  year old male who presents to the office today for a preoperative consultation at the request of surgeon Dr Drake Tavera who plans on performing left ankle hardware removal, repair syndesmosis deltoids with hardware as needed on May 10  This consultation is requested for the specific conditions prompting preoperative evaluation (i e  because of potential affect on operative risk)  Planned anesthesia: general  The patient has the following known anesthesia issues: none  Patients bleeding risk: no recent abnormal bleeding, no remote history of abnormal bleeding and no use of Ca-channel blockers  Patient does not have objections to receiving blood products if needed  Patient is able to walk 4 blocks without symptoms  Patient is able to walk up 2 flights of stairs without symptoms  Significant past no medical history  Tobacco use: Currently quitting, 1 cigarette a day  Alcohol use: Negative  Illicit drug use: Negative    Symptoms:   Easy bleeding: no  Easy bruising: no  Frequent nose bleeds: no  Chest pain: no  Cough: no  Dyspnea on exertion:no  Edema: no  Palpitations: no  Wheezing: no    Living situation: Patient lives with friend in a two-story residential home  His friend will be caring for him after surgery  hedoes not have post-op concerns  The following portions of the patient's history were reviewed and updated as appropriate: allergies, current medications, past family history, past medical history, past social history, past surgical history and problem list     Review of Systems  Review of Systems   Constitutional: Negative for activity change, appetite change, chills, diaphoresis, fatigue and fever  HENT: Negative for congestion, postnasal drip, rhinorrhea, sinus pressure, sinus pain, sneezing and sore throat      Eyes: Negative for visual disturbance  Respiratory: Negative for apnea, cough, choking, chest tightness, shortness of breath and wheezing  Cardiovascular: Negative for chest pain, palpitations and leg swelling  Gastrointestinal: Negative for abdominal distention, abdominal pain, anal bleeding, blood in stool, constipation, diarrhea, nausea and vomiting  Endocrine: Negative for cold intolerance and heat intolerance  Genitourinary: Negative for difficulty urinating, dysuria and hematuria  Musculoskeletal: Negative  Skin: Negative  Neurological: Negative for dizziness, weakness, light-headedness, numbness and headaches  Hematological: Negative for adenopathy  Psychiatric/Behavioral: Negative for agitation, sleep disturbance and suicidal ideas  All other systems reviewed and are negative  Past Medical History:   Diagnosis Date    History of transfusion 1983    No Reaction    Kidney stone     Opiate abuse, continuous (HCC)     SUSAN (obstructive sleep apnea)     No CPAP    Subdural hematoma (HCC)      Past Surgical History:   Procedure Laterality Date    BRAIN HEMATOMA EVACUATION      age 4    ORIF ELBOW FRACTURE Left     LA OPEN TREATMENT BIMALLEOLAR ANKLE FRACTURE Left 3/8/2023    Procedure: BIMALLEOLAR ORIF OF ANKLE;  Surgeon: Jarrod Cortes DPM;  Location: [address] [place] [address];  Service: Podiatry     Social History     Tobacco Use    Smoking status: Every Day     Packs/day: 0 01     Years: 20 00     Pack years: 0 20     Types: Cigarettes     Start date: 2003    Smokeless tobacco: Never    Tobacco comments:     1 a day   Vaping Use    Vaping Use: Never used   Substance Use Topics    Alcohol use: Not Currently     Comment: No Alcohol for 5 years( 2/25/2018)    Drug use: Not Currently     Types: Oxycodone     Comment: Methadone completed 2/3/23     Family History   Problem Relation Age of Onset    No Known Problems Mother     No Known Problems Father      Patient has no known allergies    No current "outpatient medications on file  No current facility-administered medications for this visit  Objective:       /80 (BP Location: Right arm, Patient Position: Sitting, Cuff Size: Large)   Pulse 96   Temp 98 1 °F (36 7 °C) (Temporal)   Resp 18   Ht 5' 11\" (1 803 m)   Wt 117 kg (258 lb)   SpO2 98%   BMI 35 98 kg/m²   Physical Exam  Vitals and nursing note reviewed  Constitutional:       General: He is not in acute distress  Appearance: He is well-developed  He is not diaphoretic  HENT:      Head: Normocephalic and atraumatic  Eyes:      General: No scleral icterus  Right eye: No discharge  Left eye: No discharge  Conjunctiva/sclera: Conjunctivae normal       Pupils: Pupils are equal, round, and reactive to light  Neck:      Thyroid: No thyromegaly  Vascular: No JVD  Cardiovascular:      Rate and Rhythm: Normal rate and regular rhythm  Heart sounds: Normal heart sounds  No murmur heard  No friction rub  No gallop  Pulmonary:      Effort: Pulmonary effort is normal  No respiratory distress  Breath sounds: Normal breath sounds  No wheezing or rales  Chest:      Chest wall: No tenderness  Abdominal:      General: Bowel sounds are normal  There is no distension  Palpations: Abdomen is soft  There is no mass  Tenderness: There is no abdominal tenderness  There is no guarding or rebound  Musculoskeletal:         General: No tenderness or deformity  Normal range of motion  Cervical back: Normal range of motion and neck supple  Lymphadenopathy:      Cervical: No cervical adenopathy  Skin:     General: Skin is warm and dry  Coloration: Skin is not pale  Findings: No erythema or rash  Neurological:      Mental Status: He is alert and oriented to person, place, and time  Cranial Nerves: No cranial nerve deficit  Coordination: Coordination normal       Deep Tendon Reflexes: Reflexes are normal and symmetric   " Psychiatric:         Behavior: Behavior normal          Thought Content: Thought content normal          Judgment: Judgment normal             Lab Review   No visits with results within 2 Month(s) from this visit     Latest known visit with results is:   Admission on 02/18/2023, Discharged on 02/20/2023   Component Date Value    WBC 02/18/2023 12 28 (H)     RBC 02/18/2023 4 91     Hemoglobin 02/18/2023 15 2     Hematocrit 02/18/2023 44 9     MCV 02/18/2023 91     MCH 02/18/2023 31 0     MCHC 02/18/2023 33 9     RDW 02/18/2023 12 4     MPV 02/18/2023 9 5     Platelets 32/76/3825 216     nRBC 02/18/2023 0     Neutrophils Relative 02/18/2023 66     Immat GRANS % 02/18/2023 1     Lymphocytes Relative 02/18/2023 23     Monocytes Relative 02/18/2023 7     Eosinophils Relative 02/18/2023 2     Basophils Relative 02/18/2023 1     Neutrophils Absolute 02/18/2023 8 09 (H)     Immature Grans Absolute 02/18/2023 0 12     Lymphocytes Absolute 02/18/2023 2 87     Monocytes Absolute 02/18/2023 0 84     Eosinophils Absolute 02/18/2023 0 27     Basophils Absolute 02/18/2023 0 09     Sodium 02/18/2023 137     Potassium 02/18/2023 3 9     Chloride 02/18/2023 104     CO2 02/18/2023 23     ANION GAP 02/18/2023 10     BUN 02/18/2023 15     Creatinine 02/18/2023 0 80     Glucose 02/18/2023 190 (H)     Calcium 02/18/2023 8 8     eGFR 02/18/2023 105     EXTBreath Alcohol 02/18/2023 0 0     Amph/Meth UR 02/20/2023 Negative     Barbiturate Ur 02/20/2023 Negative     Benzodiazepine Urine 02/20/2023 Negative     Cocaine Urine 02/20/2023 Negative     Methadone Urine 02/20/2023 Positive (A)     Opiate Urine 02/20/2023 Negative     PCP Ur 02/20/2023 Negative     THC Urine 02/20/2023 Positive (A)     Oxycodone Urine 02/20/2023 Positive (A)     Ventricular Rate 02/18/2023 70     Atrial Rate 02/18/2023 70     RI Interval 02/18/2023 150     QRSD Interval 02/18/2023 102     QT Interval 02/18/2023 428  QTC Interval 02/18/2023 460     P Axis 02/18/2023 42     QRS Axis 02/18/2023 11     T Wave Axis 02/18/2023 16     Sodium 02/19/2023 138     Potassium 02/19/2023 5 6 (H)     Chloride 02/19/2023 103     CO2 02/19/2023 26     ANION GAP 02/19/2023 9     BUN 02/19/2023 20     Creatinine 02/19/2023 1 56 (H)     Glucose 02/19/2023 115 (H)     Calcium 02/19/2023 9 1     eGFR 02/19/2023 51     WBC 02/19/2023 15 63 (H)     RBC 02/19/2023 4 98     Hemoglobin 02/19/2023 15 5     Hematocrit 02/19/2023 46 5     MCV 02/19/2023 93     MCH 02/19/2023 31 1     MCHC 02/19/2023 33 3     RDW 02/19/2023 12 7     Platelets 87/41/0983 229     MPV 02/19/2023 10 3     Sodium 02/20/2023 135     Potassium 02/20/2023 3 9     Chloride 02/20/2023 98     CO2 02/20/2023 30     ANION GAP 02/20/2023 7     BUN 02/20/2023 18     Creatinine 02/20/2023 0 81     Glucose 02/20/2023 119 (H)     Calcium 02/20/2023 9 1     eGFR 02/20/2023 105     WBC 02/20/2023 10 63 (H)     RBC 02/20/2023 4 54     Hemoglobin 02/20/2023 13 9     Hematocrit 02/20/2023 41 9     MCV 02/20/2023 92     MCH 02/20/2023 30 6     MCHC 02/20/2023 33 2     RDW 02/20/2023 12 4     Platelets 31/07/3953 200     MPV 02/20/2023 9 9     Procalcitonin 02/20/2023 0 15     Supplier Name 02/20/2023 [employer] - [id number] [facility] Supplier Phone Number 02/20/2023 [phone]     Order Status 02/20/2023 Delivery Successful     Delivery Note 02/20/2023 Please deliver Rodri Zeeshan to Address: [address] [place] [address] [place] [id number] call pt: [phone]     Delivery Request Date 02/20/2023 02/20/2023     Date Delivered  02/20/2023 02/24/2023     Supplier Name 02/20/2023 02/23/2023     Item Description 02/20/2023 Cleo Medina, Adult         Assessment:     49 y o  male with planned surgery as above      Known risk factors for perioperative complications: None      Cardiac Risk Estimation: low risk    Benito Gong is cleared from a cardiovascular standpoint to proceed with surgery  he is at a low risk from a cardiovascular standpoint at this time without any additional cardiac testing  Reevaluation needed if he should present with symptoms prior to surgery  Plan:  Preoperative workup as follows none  Change in medication regimen before surgery: none, continue medication regimen including morning of surgery, with sip of water       Assessment/Plan:    Problem List Items Addressed This Visit        Musculoskeletal and Integument    Closed fracture of distal end of left tibia    Closed nondisplaced spiral fracture of shaft of left fibula with delayed healing       Other    Preop exam for internal medicine - Primary

## 2023-05-04 NOTE — H&P (VIEW-ONLY)
Subjective:  Chief Complaint   Patient presents with   • Pre-op Exam      Left foot surgery PA foot and ankle  5/10/2023, Dr Raheel Perez is a 49y o  year old male who presents to the office today for a preoperative consultation at the request of surgeon Dr Jesus Daniel who plans on performing left ankle hardware removal, repair syndesmosis deltoids with hardware as needed on May 10  This consultation is requested for the specific conditions prompting preoperative evaluation (i e  because of potential affect on operative risk)  Planned anesthesia: general  The patient has the following known anesthesia issues: none  Patients bleeding risk: no recent abnormal bleeding, no remote history of abnormal bleeding and no use of Ca-channel blockers  Patient does not have objections to receiving blood products if needed  Patient is able to walk 4 blocks without symptoms  Patient is able to walk up 2 flights of stairs without symptoms  Significant past no medical history  Tobacco use: Currently quitting, 1 cigarette a day  Alcohol use: Negative  Illicit drug use: Negative    Symptoms:   Easy bleeding: no  Easy bruising: no  Frequent nose bleeds: no  Chest pain: no  Cough: no  Dyspnea on exertion:no  Edema: no  Palpitations: no  Wheezing: no    Living situation: Patient lives with friend in a two-story residential home  His friend will be caring for him after surgery  hedoes not have post-op concerns  The following portions of the patient's history were reviewed and updated as appropriate: allergies, current medications, past family history, past medical history, past social history, past surgical history and problem list     Review of Systems  Review of Systems   Constitutional: Negative for activity change, appetite change, chills, diaphoresis, fatigue and fever  HENT: Negative for congestion, postnasal drip, rhinorrhea, sinus pressure, sinus pain, sneezing and sore throat      Eyes: Negative for visual disturbance  Respiratory: Negative for apnea, cough, choking, chest tightness, shortness of breath and wheezing  Cardiovascular: Negative for chest pain, palpitations and leg swelling  Gastrointestinal: Negative for abdominal distention, abdominal pain, anal bleeding, blood in stool, constipation, diarrhea, nausea and vomiting  Endocrine: Negative for cold intolerance and heat intolerance  Genitourinary: Negative for difficulty urinating, dysuria and hematuria  Musculoskeletal: Negative  Skin: Negative  Neurological: Negative for dizziness, weakness, light-headedness, numbness and headaches  Hematological: Negative for adenopathy  Psychiatric/Behavioral: Negative for agitation, sleep disturbance and suicidal ideas  All other systems reviewed and are negative  Past Medical History:   Diagnosis Date   • History of transfusion 1983    No Reaction   • Kidney stone    • Opiate abuse, continuous ([facility] [address] )    • SUSAN (obstructive sleep apnea)     No CPAP   • Subdural hematoma (HCC)      Past Surgical History:   Procedure Laterality Date   • BRAIN HEMATOMA EVACUATION      age 4   • ORIF ELBOW FRACTURE Left    • WA OPEN TREATMENT BIMALLEOLAR ANKLE FRACTURE Left 3/8/2023    Procedure: BIMALLEOLAR ORIF OF ANKLE;  Surgeon: Alexandra Orellana DPM;  Location: [address] [place] [address];  Service: Podiatry     Social History     Tobacco Use   • Smoking status: Every Day     Packs/day: 0 01     Years: 20 00     Pack years: 0 20     Types: Cigarettes     Start date: 2003   • Smokeless tobacco: Never   • Tobacco comments:     1 a day   Vaping Use   • Vaping Use: Never used   Substance Use Topics   • Alcohol use: Not Currently     Comment: No Alcohol for 5 years( 2/25/2018)   • Drug use: Not Currently     Types: Oxycodone     Comment: Methadone completed 2/3/23     Family History   Problem Relation Age of Onset   • No Known Problems Mother    • No Known Problems Father      Patient has no known allergies    No current "outpatient medications on file  No current facility-administered medications for this visit  Objective:       /80 (BP Location: Right arm, Patient Position: Sitting, Cuff Size: Large)   Pulse 96   Temp 98 1 °F (36 7 °C) (Temporal)   Resp 18   Ht 5' 11\" (1 803 m)   Wt 117 kg (258 lb)   SpO2 98%   BMI 35 98 kg/m²   Physical Exam  Vitals and nursing note reviewed  Constitutional:       General: He is not in acute distress  Appearance: He is well-developed  He is not diaphoretic  HENT:      Head: Normocephalic and atraumatic  Eyes:      General: No scleral icterus  Right eye: No discharge  Left eye: No discharge  Conjunctiva/sclera: Conjunctivae normal       Pupils: Pupils are equal, round, and reactive to light  Neck:      Thyroid: No thyromegaly  Vascular: No JVD  Cardiovascular:      Rate and Rhythm: Normal rate and regular rhythm  Heart sounds: Normal heart sounds  No murmur heard  No friction rub  No gallop  Pulmonary:      Effort: Pulmonary effort is normal  No respiratory distress  Breath sounds: Normal breath sounds  No wheezing or rales  Chest:      Chest wall: No tenderness  Abdominal:      General: Bowel sounds are normal  There is no distension  Palpations: Abdomen is soft  There is no mass  Tenderness: There is no abdominal tenderness  There is no guarding or rebound  Musculoskeletal:         General: No tenderness or deformity  Normal range of motion  Cervical back: Normal range of motion and neck supple  Lymphadenopathy:      Cervical: No cervical adenopathy  Skin:     General: Skin is warm and dry  Coloration: Skin is not pale  Findings: No erythema or rash  Neurological:      Mental Status: He is alert and oriented to person, place, and time  Cranial Nerves: No cranial nerve deficit  Coordination: Coordination normal       Deep Tendon Reflexes: Reflexes are normal and symmetric   " Psychiatric:         Behavior: Behavior normal          Thought Content: Thought content normal          Judgment: Judgment normal             Lab Review   No visits with results within 2 Month(s) from this visit     Latest known visit with results is:   Admission on 02/18/2023, Discharged on 02/20/2023   Component Date Value   • WBC 02/18/2023 12 28 (H)    • RBC 02/18/2023 4 91    • Hemoglobin 02/18/2023 15 2    • Hematocrit 02/18/2023 44 9    • MCV 02/18/2023 91    • MCH 02/18/2023 31 0    • MCHC 02/18/2023 33 9    • RDW 02/18/2023 12 4    • MPV 02/18/2023 9 5    • Platelets 58/77/3339 216    • nRBC 02/18/2023 0    • Neutrophils Relative 02/18/2023 66    • Immat GRANS % 02/18/2023 1    • Lymphocytes Relative 02/18/2023 23    • Monocytes Relative 02/18/2023 7    • Eosinophils Relative 02/18/2023 2    • Basophils Relative 02/18/2023 1    • Neutrophils Absolute 02/18/2023 8 09 (H)    • Immature Grans Absolute 02/18/2023 0 12    • Lymphocytes Absolute 02/18/2023 2 87    • Monocytes Absolute 02/18/2023 0 84    • Eosinophils Absolute 02/18/2023 0 27    • Basophils Absolute 02/18/2023 0 09    • Sodium 02/18/2023 137    • Potassium 02/18/2023 3 9    • Chloride 02/18/2023 104    • CO2 02/18/2023 23    • ANION GAP 02/18/2023 10    • BUN 02/18/2023 15    • Creatinine 02/18/2023 0 80    • Glucose 02/18/2023 190 (H)    • Calcium 02/18/2023 8 8    • eGFR 02/18/2023 105    • EXTBreath Alcohol 02/18/2023 0 0    • Amph/Meth UR 02/20/2023 Negative    • Barbiturate Ur 02/20/2023 Negative    • Benzodiazepine Urine 02/20/2023 Negative    • Cocaine Urine 02/20/2023 Negative    • Methadone Urine 02/20/2023 Positive (A)    • Opiate Urine 02/20/2023 Negative    • PCP Ur 02/20/2023 Negative    • THC Urine 02/20/2023 Positive (A)    • Oxycodone Urine 02/20/2023 Positive (A)    • Ventricular Rate 02/18/2023 70    • Atrial Rate 02/18/2023 70    • [place] Interval 02/18/2023 150    • QRSD Interval 02/18/2023 102    • QT Interval 02/18/2023 426 • QTC Interval 02/18/2023 460    • P Axis 02/18/2023 42    • QRS Axis 02/18/2023 11    • T Wave Axis 02/18/2023 16    • Sodium 02/19/2023 138    • Potassium 02/19/2023 5 6 (H)    • Chloride 02/19/2023 103    • CO2 02/19/2023 26    • ANION GAP 02/19/2023 9    • BUN 02/19/2023 20    • Creatinine 02/19/2023 1 56 (H)    • Glucose 02/19/2023 115 (H)    • Calcium 02/19/2023 9 1    • eGFR 02/19/2023 51    • WBC 02/19/2023 15 63 (H)    • RBC 02/19/2023 4 98    • Hemoglobin 02/19/2023 15 5    • Hematocrit 02/19/2023 46 5    • MCV 02/19/2023 93    • MCH 02/19/2023 31 1    • MCHC 02/19/2023 33 3    • RDW 02/19/2023 12 7    • Platelets 56/36/5187 229    • MPV 02/19/2023 10 3    • Sodium 02/20/2023 135    • Potassium 02/20/2023 3 9    • Chloride 02/20/2023 98    • CO2 02/20/2023 30    • ANION GAP 02/20/2023 7    • BUN 02/20/2023 18    • Creatinine 02/20/2023 0 81    • Glucose 02/20/2023 119 (H)    • Calcium 02/20/2023 9 1    • eGFR 02/20/2023 105    • WBC 02/20/2023 10 63 (H)    • RBC 02/20/2023 4 54    • Hemoglobin 02/20/2023 13 9    • Hematocrit 02/20/2023 41 9    • MCV 02/20/2023 92    • MCH 02/20/2023 30 6    • MCHC 02/20/2023 33 2    • RDW 02/20/2023 12 4    • Platelets 68/39/8900 200    • MPV 02/20/2023 9 9    • Procalcitonin 02/20/2023 0 15    • Supplier Name 02/20/2023 [employer]    • Supplier Phone Number 02/20/2023 [phone]    • Order Status 02/20/2023 Delivery Successful    • Delivery Note 02/20/2023 Please deliver Khari Joshi to Address: [address] [id number] call pt: [phone]    • Delivery Request Date 02/20/2023 02/20/2023    • Date Delivered  02/20/2023 02/24/2023    • Supplier Name 02/20/2023 02/23/2023    • Item Description 02/20/2023 Manish Tavarez, Adult         Assessment:     49 y o  male with planned surgery as above      Known risk factors for perioperative complications: None      Cardiac Risk Estimation: low risk    Soria Mering is cleared from a cardiovascular standpoint to proceed with surgery  he is at a low risk from a cardiovascular standpoint at this time without any additional cardiac testing  Reevaluation needed if he should present with symptoms prior to surgery  Plan:  Preoperative workup as follows none  Change in medication regimen before surgery: none, continue medication regimen including morning of surgery, with sip of water       Assessment/Plan:    Problem List Items Addressed This Visit        Musculoskeletal and Integument    Closed fracture of distal end of left tibia    Closed nondisplaced spiral fracture of shaft of left fibula with delayed healing       Other    Preop exam for internal medicine - Primary

## 2023-05-05 ENCOUNTER — ANESTHESIA EVENT (OUTPATIENT)
Dept: PERIOP | Facility: HOSPITAL | Age: 48
End: 2023-05-05

## 2023-05-08 NOTE — PRE-PROCEDURE INSTRUCTIONS
No outpatient medications have been marked as taking for the 5/10/23 encounter [facility] Encounter)  Pt reports not taking any meds currently--Medication instructions for day surgery reviewed  Please use only a sip of water to take your instructed medications  Avoid all over the counter vitamins, supplements and NSAIDS for one week prior to surgery per anesthesia guidelines  Tylenol is ok to take as needed  You will receive a call one business day prior to surgery with an arrival time and hospital directions  If your surgery is scheduled on a Monday, the hospital will be calling you on the Friday prior to your surgery  If you have not heard from anyone by 8pm, please call the hospital supervisor through the hospital  at [phone]  Juna Goltz [phone])  Do not eat or drink anything after midnight the night before your surgery, including candy, mints, lifesavers, or chewing gum  Do not drink alcohol 24hrs before your surgery  Try not to smoke at least 24hrs before your surgery  Follow the pre surgery showering instructions as listed in the [facility] Surgical Experience Booklet” or otherwise provided by your surgeon's office  Do not shave the surgical area 24 hours before surgery  Do not apply any lotions, creams, including makeup, cologne, deodorant, or perfumes after showering on the day of your surgery  No contact lenses, eye make-up, or artificial eyelashes  Remove nail polish, including gel polish, and any artificial, gel, or acrylic nails if possible  Remove all jewelry including rings and body piercing jewelry  Wear causal clothing that is easy to take on and off  Consider your type of surgery  Keep any valuables, jewelry, piercings at home  Please bring any specially ordered equipment (sling, braces) if indicated  Arrange for a responsible person to drive you to and from the hospital on the day of your surgery  Visitor Guidelines discussed       Call the surgeon's office with any new illnesses, exposures, or additional questions prior to surgery  Please reference your [facility] Surgical Experience Booklet” for additional information to prepare for your upcoming surgery

## 2023-05-09 PROBLEM — G47.30 SLEEP APNEA: Status: ACTIVE | Noted: 2023-02-18

## 2023-05-09 NOTE — ANESTHESIA PREPROCEDURE EVALUATION
Procedure:  REMOVAL HARDWARE FROM ANKLE, REPAIR SYNDESMOSIS DELTOIDS WITH HARDWARE AS NEEDED (Left: Ankle)    Relevant Problems   ANESTHESIA (within normal limits)      CARDIO (within normal limits)      /RENAL (within normal limits)      HEMATOLOGY (within normal limits)      MUSCULOSKELETAL (within normal limits)      NEURO/PSYCH   (+) Chronic, continuous use of opioids      PULMONARY   (+) Sleep apnea   (+) Smoking        Physical Exam    Airway    Mallampati score: II         Dental   upper dentures,     Cardiovascular  Cardiovascular exam normal    Pulmonary  Pulmonary exam normal     Other Findings        Anesthesia Plan  ASA Score- 2     Anesthesia Type- general with ASA Monitors  Additional Monitors:   Airway Plan:     Comment: POP BLOCK   Plan Factors-Exercise tolerance (METS): >4 METS  Chart reviewed  Existing labs reviewed  Patient summary reviewed  Patient is a current smoker  Patient instructed to abstain from smoking on day of procedure  Patient did not smoke on day of surgery  Induction- intravenous  Postoperative Plan- Plan for postoperative opioid use  Informed Consent- Anesthetic plan and risks discussed with patient  I personally reviewed this patient with the CRNA  Discussed and agreed on the Anesthesia Plan with the CRNA  Macho Navarro           No results found for: HGBA1C    Lab Results   Component Value Date    K 3 9 02/20/2023    CL 98 02/20/2023    CO2 30 02/20/2023    BUN 18 02/20/2023    CREATININE 0 81 02/20/2023    CALCIUM 9 1 02/20/2023    EGFR 105 02/20/2023       Lab Results   Component Value Date    WBC 10 63 (H) 02/20/2023    HGB 13 9 02/20/2023    HCT 41 9 02/20/2023    MCV 92 02/20/2023     02/20/2023   Normal sinus rhythm  Normal ECG  No previous ECGs available  Confirmed by Sukhjinder Santana ([id number]) on 2/19/2023 2:19:29 AM

## 2023-05-10 ENCOUNTER — APPOINTMENT (OUTPATIENT)
Dept: RADIOLOGY | Facility: HOSPITAL | Age: 48
End: 2023-05-10

## 2023-05-10 ENCOUNTER — HOSPITAL ENCOUNTER (OUTPATIENT)
Facility: HOSPITAL | Age: 48
Setting detail: OUTPATIENT SURGERY
Discharge: HOME | End: 2023-05-10
Attending: PODIATRIST | Admitting: PODIATRIST

## 2023-05-10 ENCOUNTER — ANESTHESIA (OUTPATIENT)
Dept: PERIOP | Facility: HOSPITAL | Age: 48
End: 2023-05-10

## 2023-05-10 VITALS
SYSTOLIC BLOOD PRESSURE: 159 MMHG | RESPIRATION RATE: 18 BRPM | OXYGEN SATURATION: 96 % | DIASTOLIC BLOOD PRESSURE: 92 MMHG | WEIGHT: 250 LBS | HEART RATE: 78 BPM | TEMPERATURE: 97.7 F | HEIGHT: 71 IN | BODY MASS INDEX: 35 KG/M2

## 2023-05-10 PROBLEM — F17.200 SMOKING: Status: ACTIVE | Noted: 2023-05-10

## 2023-05-10 PROBLEM — IMO0001 SMOKING: Status: ACTIVE | Noted: 2023-05-10

## 2023-05-10 DEVICE — BONE SCREW
Type: IMPLANTABLE DEVICE | Site: ANKLE | Status: FUNCTIONAL
Brand: VARIAX

## 2023-05-10 RX ORDER — FENTANYL CITRATE/PF 50 MCG/ML
50 SYRINGE (ML) INJECTION
Status: COMPLETED | OUTPATIENT
Start: 2023-05-10 | End: 2023-05-10

## 2023-05-10 RX ORDER — KETAMINE HYDROCHLORIDE 100 MG/ML
INJECTION INTRAMUSCULAR; INTRAVENOUS AS NEEDED
Status: DISCONTINUED | OUTPATIENT
Start: 2023-05-10 | End: 2023-05-10

## 2023-05-10 RX ORDER — HYDROMORPHONE HCL/PF 1 MG/ML
SYRINGE (ML) INJECTION AS NEEDED
Status: DISCONTINUED | OUTPATIENT
Start: 2023-05-10 | End: 2023-05-10

## 2023-05-10 RX ORDER — PROPOFOL 10 MG/ML
INJECTION, EMULSION INTRAVENOUS AS NEEDED
Status: DISCONTINUED | OUTPATIENT
Start: 2023-05-10 | End: 2023-05-10

## 2023-05-10 RX ORDER — HYDROMORPHONE HCL/PF 1 MG/ML
0.4 SYRINGE (ML) INJECTION
Status: COMPLETED | OUTPATIENT
Start: 2023-05-10 | End: 2023-05-10

## 2023-05-10 RX ORDER — ONDANSETRON 2 MG/ML
4 INJECTION INTRAMUSCULAR; INTRAVENOUS ONCE AS NEEDED
Status: DISCONTINUED | OUTPATIENT
Start: 2023-05-10 | End: 2023-05-10 | Stop reason: HOSPADM

## 2023-05-10 RX ORDER — SODIUM CHLORIDE, SODIUM LACTATE, POTASSIUM CHLORIDE, CALCIUM CHLORIDE 600; 310; 30; 20 MG/100ML; MG/100ML; MG/100ML; MG/100ML
INJECTION, SOLUTION INTRAVENOUS CONTINUOUS PRN
Status: DISCONTINUED | OUTPATIENT
Start: 2023-05-10 | End: 2023-05-10

## 2023-05-10 RX ORDER — BUPIVACAINE HYDROCHLORIDE 2.5 MG/ML
INJECTION, SOLUTION EPIDURAL; INFILTRATION; INTRACAUDAL
Status: COMPLETED | OUTPATIENT
Start: 2023-05-10 | End: 2023-05-10

## 2023-05-10 RX ORDER — ONDANSETRON 2 MG/ML
INJECTION INTRAMUSCULAR; INTRAVENOUS AS NEEDED
Status: DISCONTINUED | OUTPATIENT
Start: 2023-05-10 | End: 2023-05-10

## 2023-05-10 RX ORDER — SODIUM CHLORIDE, SODIUM LACTATE, POTASSIUM CHLORIDE, CALCIUM CHLORIDE 600; 310; 30; 20 MG/100ML; MG/100ML; MG/100ML; MG/100ML
50 INJECTION, SOLUTION INTRAVENOUS CONTINUOUS
Status: DISCONTINUED | OUTPATIENT
Start: 2023-05-10 | End: 2023-05-10 | Stop reason: HOSPADM

## 2023-05-10 RX ORDER — OXYCODONE HYDROCHLORIDE AND ACETAMINOPHEN 5; 325 MG/1; MG/1
1 TABLET ORAL EVERY 4 HOURS PRN
Status: DISCONTINUED | OUTPATIENT
Start: 2023-05-10 | End: 2023-05-10 | Stop reason: HOSPADM

## 2023-05-10 RX ORDER — MIDAZOLAM HYDROCHLORIDE 2 MG/2ML
INJECTION, SOLUTION INTRAMUSCULAR; INTRAVENOUS
Status: COMPLETED | OUTPATIENT
Start: 2023-05-10 | End: 2023-05-10

## 2023-05-10 RX ORDER — MAGNESIUM HYDROXIDE 1200 MG/15ML
LIQUID ORAL AS NEEDED
Status: DISCONTINUED | OUTPATIENT
Start: 2023-05-10 | End: 2023-05-10 | Stop reason: HOSPADM

## 2023-05-10 RX ORDER — CEFAZOLIN SODIUM 2 G/50ML
SOLUTION INTRAVENOUS AS NEEDED
Status: DISCONTINUED | OUTPATIENT
Start: 2023-05-10 | End: 2023-05-10

## 2023-05-10 RX ORDER — DEXAMETHASONE SODIUM PHOSPHATE 10 MG/ML
INJECTION, SOLUTION INTRAMUSCULAR; INTRAVENOUS AS NEEDED
Status: DISCONTINUED | OUTPATIENT
Start: 2023-05-10 | End: 2023-05-10

## 2023-05-10 RX ORDER — FENTANYL CITRATE 50 UG/ML
INJECTION, SOLUTION INTRAMUSCULAR; INTRAVENOUS
Status: COMPLETED | OUTPATIENT
Start: 2023-05-10 | End: 2023-05-10

## 2023-05-10 RX ORDER — KETOROLAC TROMETHAMINE 30 MG/ML
INJECTION, SOLUTION INTRAMUSCULAR; INTRAVENOUS AS NEEDED
Status: DISCONTINUED | OUTPATIENT
Start: 2023-05-10 | End: 2023-05-10

## 2023-05-10 RX ADMIN — MIDAZOLAM 2 MG: 1 INJECTION INTRAMUSCULAR; INTRAVENOUS at 07:20

## 2023-05-10 RX ADMIN — SODIUM CHLORIDE, SODIUM LACTATE, POTASSIUM CHLORIDE, AND CALCIUM CHLORIDE 50 ML/HR: .6; .31; .03; .02 INJECTION, SOLUTION INTRAVENOUS at 06:14

## 2023-05-10 RX ADMIN — HYDROMORPHONE HYDROCHLORIDE 0.4 MG: 1 INJECTION, SOLUTION INTRAMUSCULAR; INTRAVENOUS; SUBCUTANEOUS at 09:38

## 2023-05-10 RX ADMIN — DEXAMETHASONE SODIUM PHOSPHATE 8 MG: 10 INJECTION, SOLUTION INTRAMUSCULAR; INTRAVENOUS at 08:00

## 2023-05-10 RX ADMIN — KETAMINE HYDROCHLORIDE 30 MG: 100 INJECTION INTRAMUSCULAR; INTRAVENOUS at 08:13

## 2023-05-10 RX ADMIN — SODIUM CHLORIDE, SODIUM LACTATE, POTASSIUM CHLORIDE, AND CALCIUM CHLORIDE: .6; .31; .03; .02 INJECTION, SOLUTION INTRAVENOUS at 07:47

## 2023-05-10 RX ADMIN — HYDROMORPHONE HYDROCHLORIDE 1 MG: 1 INJECTION, SOLUTION INTRAMUSCULAR; INTRAVENOUS; SUBCUTANEOUS at 08:35

## 2023-05-10 RX ADMIN — FENTANYL CITRATE 50 MCG: 50 INJECTION, SOLUTION INTRAMUSCULAR; INTRAVENOUS at 09:18

## 2023-05-10 RX ADMIN — OXYCODONE HYDROCHLORIDE AND ACETAMINOPHEN 1 TABLET: 5; 325 TABLET ORAL at 10:17

## 2023-05-10 RX ADMIN — FENTANYL CITRATE 50 MCG: 50 INJECTION, SOLUTION INTRAMUSCULAR; INTRAVENOUS at 08:03

## 2023-05-10 RX ADMIN — KETOROLAC TROMETHAMINE 30 MG: 30 INJECTION, SOLUTION INTRAMUSCULAR at 08:46

## 2023-05-10 RX ADMIN — FENTANYL CITRATE 50 MCG: 50 INJECTION, SOLUTION INTRAMUSCULAR; INTRAVENOUS at 09:23

## 2023-05-10 RX ADMIN — FENTANYL CITRATE 50 MCG: 50 INJECTION, SOLUTION INTRAMUSCULAR; INTRAVENOUS at 09:28

## 2023-05-10 RX ADMIN — FENTANYL CITRATE 50 MCG: 50 INJECTION, SOLUTION INTRAMUSCULAR; INTRAVENOUS at 07:55

## 2023-05-10 RX ADMIN — FENTANYL CITRATE 50 MCG: 50 INJECTION, SOLUTION INTRAMUSCULAR; INTRAVENOUS at 07:21

## 2023-05-10 RX ADMIN — PROPOFOL 200 MG: 10 INJECTION, EMULSION INTRAVENOUS at 07:50

## 2023-05-10 RX ADMIN — KETAMINE HYDROCHLORIDE 20 MG: 100 INJECTION INTRAMUSCULAR; INTRAVENOUS at 08:32

## 2023-05-10 RX ADMIN — HYDROMORPHONE HYDROCHLORIDE 0.4 MG: 1 INJECTION, SOLUTION INTRAMUSCULAR; INTRAVENOUS; SUBCUTANEOUS at 09:48

## 2023-05-10 RX ADMIN — HYDROMORPHONE HYDROCHLORIDE 0.4 MG: 1 INJECTION, SOLUTION INTRAMUSCULAR; INTRAVENOUS; SUBCUTANEOUS at 09:58

## 2023-05-10 RX ADMIN — CEFAZOLIN SODIUM 2000 MG: 2 SOLUTION INTRAVENOUS at 07:55

## 2023-05-10 RX ADMIN — ONDANSETRON 4 MG: 2 INJECTION INTRAMUSCULAR; INTRAVENOUS at 08:46

## 2023-05-10 RX ADMIN — BUPIVACAINE 20 ML: 13.3 INJECTION, SUSPENSION, LIPOSOMAL INFILTRATION at 07:27

## 2023-05-10 RX ADMIN — FENTANYL CITRATE 50 MCG: 50 INJECTION, SOLUTION INTRAMUSCULAR; INTRAVENOUS at 08:12

## 2023-05-10 RX ADMIN — BUPIVACAINE HYDROCHLORIDE 10 ML: 2.5 INJECTION, SOLUTION EPIDURAL; INFILTRATION; INTRACAUDAL; PERINEURAL at 07:25

## 2023-05-10 NOTE — ANESTHESIA POSTPROCEDURE EVALUATION
Post-Op Assessment Note    CV Status:  Stable  Pain Score: 10    Pain management: adequate  Multimodal analgesia used between 6 hours prior to anesthesia start to PACU discharge    Mental Status:  Alert and awake   Hydration Status:  Euvolemic   PONV Controlled:  Controlled   Airway Patency:  Patent      Post Op Vitals Reviewed: Yes      Staff: CRNA         No notable events documented      BP (!) 172/84 (05/10/23 0913)    Temp 98 2 °F (36 8 °C) (05/10/23 0913)    Pulse 91 (05/10/23 0913)   Resp 16 (05/10/23 0913)    SpO2 94 % (05/10/23 0913)

## 2023-05-10 NOTE — DISCHARGE INSTR - AVS FIRST PAGE
Dr Mariana Beltran  Post-Operative Instructions    Pain / Swelling  There is expected to be some discomfort, swelling and bruising of the foot  You might see some blood on the bandage  This is not a cause for alarm  However, if there is active or persistent bleeding (blood running out of the bandage while at rest) - call the office at once  Apply an ice bag to left knee for 30 minutes for each waking hour, for the first 72 hours  This should be discontinued when sleeping  This will also work through your cast if you have one  Ice must not leak and wet the dressings  Also, using the ice inappropriately can cause permanent nerve damage and frostbite  Your foot should be elevated as much as possible for the first 72 hours  The foot should be above heart level  If your foot is below heart level, throbbing and pain will increase  When sleeping, elevation can be accomplished by putting a small hard suitcase between the box spring and mattress at the foot of the bed  Walking and standing will increase pain, throbbing and bleeding  Persistent pain despite elevation and your pain meds can many times be relieved by removing the tight brown compression layer (called the ACE wrap) that is over the white gauze dressing  If you are elevating and taking your pain meds and pain is still severe, remove this brown stretchy layer but leave the gauze intact  Wait 30 minutes  If the pain subsides, reapply the ACE so it’s not so tight  If pain doesn’t get better, call your doctor  Dressings / Casts  Do not remove your surgical dressings - they will be changed at your doctor appointment  Do not allow surgical dressings to get wet  Sponge baths should be used until the sutures are removed  Do not try to keep the foot dry using a garbage bag and tape - this rarely works  If you get your dressings or cast wet - call your doctor immediately  If your cast or dressings feel tight - elevate your foot for 30 minutes   If this doesn’t help and you feel tingling or see toe discoloration - call your doctor  Do not put things in your cast such as powder, coat hangers to scratch, etc  This can cause skin damage and infections  Infection  If you have a fever at or above 100 degrees, chills, sweats, or notice red streaks rising above the dressing or smell odor / see pus (creamy white drainage), call your doctor immediately  Constipation  If you have severe constipation after surgery, this can be due to the pain medication  Notify your doctor and special medication will be prescribed to deal with this  Numbness  It is normal for your foot to be numb until about dinner time  If you’ve had a popliteal block procedure, you might be numb until the following day  When you start to feel pins and needles in the foot - this means the block is wearing off  That is the appropriate time to take your pain medication  Pain Medication  Take your post-operative pain medication as directed by your surgeon  Do not supplement your pain medication with over the counter drugs, old leftover pain medications, or extra Tylenol  You must discuss any additional medications with your doctor prior to taking them for pain  Driving  No driving is allowed without discussion with the doctor  Ambulation  Nonweightbearing to surgical foot  If given a flat, stiff shoe / darco wedge shoe, Do not walk at all without it  Use a device (cane, walker, crutches) to take some weight off of the foot when walking  If instructed not to put weight on the surgical foot, use the following:  Crutches     Putting weight on the foot will lead to complications

## 2023-05-10 NOTE — ANESTHESIA PROCEDURE NOTES
Peripheral Block    Patient location during procedure: holding area  Start time: 5/10/2023 7:00 AM  Reason for block: at surgeon's request and post-op pain management  Staffing  Performed: Anesthesiologist   Anesthesiologist: Simona Brito MD  Preanesthetic Checklist  Completed: patient identified, IV checked, site marked, risks and benefits discussed, surgical consent, monitors and equipment checked, pre-op evaluation and timeout performed  Peripheral Block  Patient position: prone  Prep: ChloraPrep  Patient monitoring: continuous pulse ox and frequent blood pressure checks  Block type: popliteal  Laterality: left  Injection technique: single-shot  Procedures: ultrasound guided, Ultrasound guidance required for the procedure to increase accuracy and safety of medication placement and decrease risk of complications    Ultrasound permanent image savedbupivacaine (PF) (MARCAINE) 0 25 % 10 mL - Perineural   10 mL - 5/10/2023 7:25:00 AM  midazolam (VERSED) 2 mg/2 mL - Intravenous   2 mg - 5/10/2023 7:20:00 AM  fentaNYL 50 mcg/mL - Intravenous   50 mcg - 5/10/2023 7:21:00 AM  Needle  Needle type: Stimuplex   Needle gauge: 21 G  Needle localization: ultrasound guidance  Test dose: negative  Assessment  Injection assessment: incremental injection, local visualized surrounding nerve on ultrasound and negative aspiration for heme  Paresthesia pain: none  Heart rate change: no  patient tolerated the procedure well with no immediate complications

## 2023-05-10 NOTE — INTERVAL H&P NOTE
H&P reviewed  After examining the patient I find no changes in the patients condition since the H&P had been written      Vitals:    05/10/23 0618   BP: 148/91   Pulse: 85   Resp: 18   Temp: (!) 96 7 °F (35 9 °C)   SpO2: 98%

## 2023-05-10 NOTE — OP NOTE
OPERATIVE REPORT - Podiatry  PATIENT NAME: Sharon Gaytan    :  1975  MRN: [id number]  Pt Location:  OR ROOM 11    SURGERY DATE: 5/10/2023    Surgeon(s) and Role:     * Isauro Parra DPM - Primary     * Catrachita Bourne DPM - Assisting    Pre-op Diagnosis:  Sprain of tibiofibular ligament of left ankle, initial encounter [S93 432A]  Sprain of deltoid ligament of left ankle, initial encounter [T09 347T]    Post-Op Diagnosis Codes: * Sprain of tibiofibular ligament of left ankle, initial encounter [P67 858N]     * Sprain of deltoid ligament of left ankle, initial encounter [S93 422A]    Procedure(s) (LRB):  REMOVAL HARDWARE FROM ANKLE, REPAIR SYNDESMOSIS DELTOIDS WITH HARDWARE AS NEEDED (Left)    Specimen(s):  * No specimens in log *    Estimated Blood Loss:   Minimal    Drains:  * No LDAs found *    Anesthesia Type:   General w/ Popliteal Block    Hemostasis:  Anatomic dissection, pressure    Materials:  Implant Name Type Inv  Item Serial No   Lot No  LRB No  Used Action   SCREW LCK 3 5 X 12MM FLLY THRD - [id number]  SCREW LCK 3 5 X 12MM FLLY THRD  [employer]  Left 1 Explanted   SYNCHFIX GRAVITY SYNDESMOSIS NO 5 SUTURE - [id number]  SYNCHFIX GRAVITY SYNDESMOSIS NO 5 SUTURE  [facility] [facility] [id number] Left 1 Explanted   SCREW 3 5 X 50MM T10 FLLY TRHD - [id number] Screw SCREW 3 5 X 50MM T10 FLLY TRHD  [employer]  Left 1 Implanted   SCREW 3 5 X 44MM T10 FLLY TRHD - [id number] Screw SCREW 3 5 X 44MM T10 FLLY TRHD  [employer]  Left 1 Implanted     Vicryl  Nylon    Operative Findings:  Consistent with diagnosis  Negative stress test and talar tilt post-fixation    Complications:   None    Procedure and Technique:     Under mild sedation, the patient was brought into the operating room and placed on the operating room table in the supine position   IV sedation was achieved by anesthesia team and a universal timeout was performed where all parties are in agreement of correct patient, correct procedure and correct site  A pneumatic tourniquet was then placed over the patient's left lower extremity with ample padding  The foot was then prepped and draped in the usual aseptic manner  Utilizing fluoroscopy the previously placed cinch fix was identified and marked medial laterally  Utilizing a 15 blade a linear incision was made over the distal aspect of the fibula  Blunt dissection was then carried down to the level of periosteum utilizing hemostats  Utilizing 15 blade the periosteum was incised and the tissue attachments were freed anterior and posteriorly from the plate utilizing a key elevator  The cinch fix and screw proximal to it were identified  The screw just proximal to the cinch fix was then removed and passed off to the back table  Attention was then directed to the medial aspect of the ankle that was previously marked  Utilizing a 15 blade incision made through skin and subcutaneous tissue  Blunt dissection was then carried down to the level of the cinch fix suture button utilizing hemostats  The button was identified and secured with a hemostat  The button was then lifted from the tibia and a 15 blade was utilized to cut the underlying suture strands  The button was removed from the medial aspect of the ankle and passed off the back table  Attention was then directed back to the lateral incision where the portion of the cinch fix mechanism within the plate was then removed in its entirety and passed off to the back table  Following removal of the hardware the decision was made to fixate the syndesmosis utilizing cortical screws  The syndesmosis was then clamped externally through the skin to provide reduction  The holes where the previous screw and cinchfix were removed were drilled quad-cortical and measured  Appropriate size screw lengths were selected and inserted into these drill holes under fluoroscopic guidance    Following insertion of the screws a syndesmotic stress test and "talar tilt test were performed under fluoroscopy and noted to be negative  The surgical incisions were then irrigated with copious amounts of normal sterile saline  Subcutaneous closure was obtained utilizing Vicryl  Skin edges were reapproximated and closure was obtained utilizing Nylon  The foot was then cleansed and dried  The incision site was dressed with Xeroform and a dry sterile dressing with posterior fiberglass splint  The tourniquet was not inflated during the case and normal hyperemic flush was noted to all digits  The patient tolerated the procedure and anesthesia well and was transported to the PACU with vital signs stable  Dr Sharon Andrade was present during the entire procedure and participated in all key aspects  SIGNATURE: Dru Martinez DPM  DATE: May 10, 2023  TIME: 9:15 AM      Portions of the record may have been created with voice recognition software  Occasional wrong word or \"sound a like\" substitutions may have occurred due to the inherent limitations of voice recognition software  Read the chart carefully and recognize, using context, where substitutions have occurred      "

## 2023-05-10 NOTE — NURSING NOTE
No distress  Pain decreased after medication  Dressing/ace wrap and splint remain in place  Toes pink and warm with brisk capillary refill  Crutches dispensed  Tolerated toast and drink

## 2023-06-02 ENCOUNTER — HOSPITAL ENCOUNTER (OUTPATIENT)
Dept: RADIOLOGY | Facility: IMAGING CENTER | Age: 48
End: 2023-06-02
Payer: COMMERCIAL

## 2023-06-02 DIAGNOSIS — S82.892A CLOSED FRACTURE OF LEFT ANKLE, INITIAL ENCOUNTER: ICD-10-CM

## 2023-06-02 PROCEDURE — 73610 X-RAY EXAM OF ANKLE: CPT

## 2023-08-02 ENCOUNTER — EVALUATION (OUTPATIENT)
Dept: PHYSICAL THERAPY | Facility: CLINIC | Age: 48
End: 2023-08-02
Payer: COMMERCIAL

## 2023-08-02 DIAGNOSIS — S82.842A CLOSED BIMALLEOLAR FRACTURE OF LEFT ANKLE, INITIAL ENCOUNTER: Primary | ICD-10-CM

## 2023-08-02 PROCEDURE — 97110 THERAPEUTIC EXERCISES: CPT

## 2023-08-02 PROCEDURE — 97161 PT EVAL LOW COMPLEX 20 MIN: CPT
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
## 2023-08-07 ENCOUNTER — OFFICE VISIT (OUTPATIENT)
Dept: PHYSICAL THERAPY | Facility: CLINIC | Age: 48
End: 2023-08-07
Payer: COMMERCIAL

## 2023-08-07 DIAGNOSIS — S82.842A CLOSED BIMALLEOLAR FRACTURE OF LEFT ANKLE, INITIAL ENCOUNTER: Primary | ICD-10-CM

## 2023-08-07 PROCEDURE — 97112 NEUROMUSCULAR REEDUCATION: CPT

## 2023-08-07 PROCEDURE — 97110 THERAPEUTIC EXERCISES: CPT

## 2023-08-07 PROCEDURE — 97140 MANUAL THERAPY 1/> REGIONS: CPT

## 2023-08-07 NOTE — PROGRESS NOTES
Daily Note     Today's date: 2023  Patient name: Emani Yeung  : 1975  MRN: [id number]  Referring provider: Leila Falcon DPM  Dx:   Encounter Diagnosis     ICD-10-CM    1. Closed bimalleolar fracture of left ankle, initial encounter  S82.842A                      Subjective: Pt presents to PT reporting compliance with HEP. He does report difficulty with TB inversion TE secondary to difficulty with crossing legs. Pt denies increased pain post PT session. He reports MD visit tomorrow and is hoping he will not have to wear the boot any more. Objective: See treatment diary below      Assessment:  Pt demonstrates good tolerance to TE performed. He demonstrates difficulty with toe ROM while performing TE, but expected at this time. Pt unsure about inversion with theraband stating he is moving his leg while trying to invert. Performed PROM to L foot into inversion while pt attempts AROM with band noting  positive results with improved neuro muscular AROM into L ankle inversion. Issued pt an updated HEP with pink and green theraband for clamshells with instructions on when to add the pink band and then to progress to the green. Pt reports a verbal understanding. Patient demonstrated fatigue post treatment, exhibited good technique with therapeutic exercises and would benefit from continued PT to increase flexibility, strength and function. Plan: Continue per plan of care.       Precautions: sleep apnea, closed fracture of distal end of L tibia, closed spiral fracture of shaft of L fibula with delayed healing, chronic use of opioids, smoking      Manuals            L ankle PROM nv PK           AP talar mob nv nv           Metatarsal mob  nv nv                        Neuro Re-Ed            SL clams nv 30x           SL abduction nv 30x            Toe splaying nv 30x           Piano toes nv 30x                                                  Ther Ex            Soleus stretch (HEP) 30" 30" x 3           gastroc stretch (HEP) 30" 30" x 3           Ankle 4 way (HEP ptb) 10x ea direction 30x ea           bike nv 6'           Seated rockerboard nv 30x ea  Ap/ml           Seated HR nv                                      Ther Activity 8/2 8/7                                     Gait Training 8/2                                      Modalities 8/2

## 2023-08-09 ENCOUNTER — OFFICE VISIT (OUTPATIENT)
Dept: PHYSICAL THERAPY | Facility: CLINIC | Age: 48
End: 2023-08-09
Payer: COMMERCIAL

## 2023-08-09 DIAGNOSIS — S82.842A CLOSED BIMALLEOLAR FRACTURE OF LEFT ANKLE, INITIAL ENCOUNTER: Primary | ICD-10-CM

## 2023-08-09 PROCEDURE — 97140 MANUAL THERAPY 1/> REGIONS: CPT

## 2023-08-09 PROCEDURE — 97110 THERAPEUTIC EXERCISES: CPT

## 2023-08-09 NOTE — PROGRESS NOTES
Daily Note     Today's date: 2023  Patient name: Scott Lundberg  : 1975  MRN: [id number]  Referring provider: Jeff Terrell DPM  Dx:   Encounter Diagnosis     ICD-10-CM    1. Closed bimalleolar fracture of left ankle, initial encounter  S82.842A           Start Time: 970  Stop Time:   Total time in clinic (min): 54 minutes    Subjective: Pt presents to therapy following f/u with podiatrist yesterday. He asked him about whether or not he should start weaning out of CAM boot but reports unfortunately he did not answer any of his questions regarding progressions. Objective: See treatment diary below      Assessment: Tolerated treatment well. Patient tolerated progressions well with standing HR, standing calf stretch, and standing SLS added to exercises to begin introducing weightbearing activities. Pt displayed most limitation into PF and inversion this session during PROM with limitation secondary to pain and stiffness. DF ROM was visibly improved this session during standing gastroc and soleus stretches. Pt experienced lateral hip fatigued following lateral walks this session. Pt will continue to benefit from therapy to improve strength and ROM of L ankle and LLE. Continue to progress pt as tolerated next visit. Plan: Continue per plan of care.       Precautions: sleep apnea, closed fracture of distal end of L tibia, closed spiral fracture of shaft of L fibula with delayed healing, chronic use of opioids, smoking      Manuals           L ankle PROM nv PK NS          AP talar mob nv nv           Metatarsal mob  nv nv NS                       Neuro Re-Ed           SL clams nv 30x           SL abduction nv 30x            Toe splaying nv 30x 2x20          Piano toes nv 30x 2x20          SLS   3x30"                                    Ther Ex           Soleus stretch (HEP) 30" 30" x 3 2x10 5" hold standing          gastroc stretch (HEP) 30" 30" x 3 lvl 2 2x1' Ankle 4 way (HEP ptb) 10x ea direction 30x ea nv          bike nv 6' 6'          Seated rockerboard nv 30x ea  Ap/ml 20x ap/ml          Seated HR nv  2x20          Standing HR   2x10           Lateral walks   ptb 5x at mirror          Ther Activity 8/2 8/7 8/9                                    Gait Training 8/2 8/9                                    Modalities 8/2 8/9

## 2023-08-14 ENCOUNTER — APPOINTMENT (OUTPATIENT)
Dept: PHYSICAL THERAPY | Facility: CLINIC | Age: 48
End: 2023-08-14
Payer: COMMERCIAL

## 2023-08-14 NOTE — PROGRESS NOTES
Daily Note     Today's date: 2023  Patient name: Noe Saunders  : 1975  MRN: [id number]  Referring provider: Oliverio Nj DPM  Dx: No diagnosis found. Subjective: ***      Objective: See treatment diary below      Assessment: Tolerated treatment {Tolerated treatment :[id number]}. Patient {assessment:[id number]}      Plan: Continue per plan of care.       Precautions: sleep apnea, closed fracture of distal end of L tibia, closed spiral fracture of shaft of L fibula with delayed healing, chronic use of opioids, smoking      Manuals          L ankle PROM nv PK NS          AP talar mob nv nv           Metatarsal mob  nv nv NS                       Neuro Re-Ed          SL clams nv 30x           SL abduction nv 30x            Toe splaying nv 30x 2x20          Piano toes nv 30x 2x20          SLS   3x30"                                    Ther Ex          Soleus stretch (HEP) 30" 30" x 3 2x10 5" hold standing          gastroc stretch (HEP) 30" 30" x 3 lvl 2 2x1'          Ankle 4 way (HEP ptb) 10x ea direction 30x ea nv          bike nv 6' 6'          Seated rockerboard nv 30x ea  Ap/ml 20x ap/ml          Seated HR nv  2x20          Standing HR   2x10           Lateral walks   ptb 5x at mirror          Ther Activity                                    Gait Training                                    Modalities

## 2023-08-16 ENCOUNTER — OFFICE VISIT (OUTPATIENT)
Dept: PHYSICAL THERAPY | Facility: CLINIC | Age: 48
End: 2023-08-16
Payer: COMMERCIAL

## 2023-08-16 DIAGNOSIS — S82.842A CLOSED BIMALLEOLAR FRACTURE OF LEFT ANKLE, INITIAL ENCOUNTER: Primary | ICD-10-CM

## 2023-08-16 PROCEDURE — 97112 NEUROMUSCULAR REEDUCATION: CPT

## 2023-08-16 PROCEDURE — 97110 THERAPEUTIC EXERCISES: CPT

## 2023-08-16 NOTE — PROGRESS NOTES
Daily Note     Today's date: 2023  Patient name: Emani Yeung  : 1975  MRN: [id number]  Referring provider: Leila Falcon DPM  Dx:   Encounter Diagnosis     ICD-10-CM    1. Closed bimalleolar fracture of left ankle, initial encounter  S82.842A           Start Time: 1401  Stop Time: 0935  Total time in clinic (min): 52 minutes    Subjective: Pt had to cancel previous appointment due to father passing. Discussed notifying PT if any other appointments need to be cancelled in the coming weeks as he needs to spend time with family. Objective: See treatment diary below      Assessment: Tolerated treatment well. Patient progressed rocker board to standing this session with good ROM noted. Standing HR was progressed as well with increased repetitions with good form. Weight was added for seated HR with initial cuing to control the eccentric portion of the motion. Increased resistance was used for ankle 4 ways with updated resistance band provided for home. Continue to progress pt as tolerated next visit. Plan: Continue per plan of care.       Precautions: sleep apnea, closed fracture of distal end of L tibia, closed spiral fracture of shaft of L fibula with delayed healing, chronic use of opioids, smoking      Manuals          L ankle PROM nv PK NS NS         AP talar mob nv nv           Metatarsal mob  nv nv NS NS                      Neuro Re-Ed          SL clams nv 30x           SL abduction nv 30x            Toe splaying nv 30x 2x20 2x20         Piano toes nv 30x 2x20 x20         SLS   3x30" 3x30"                                   Ther Ex          Soleus stretch (HEP) 30" 30" x 3 2x10 5" hold standing 10x 10" hold          gastroc stretch (HEP) 30" 30" x 3 lvl 2 2x1' lvl 2 2x1'         Ankle 4 way (HEP ptb) 10x ea direction 30x ea nv gtb 15x ea direction         bike nv 6' 6' 6'         Seated rockerboard nv 30x ea  Ap/ml 20x ap/ml standing ap/ml 20x Toe raises     2x10 5" hold         Seated HR nv  2x20 2x15 15#         Standing HR   2x10  2x15         Lateral walks   ptb 5x at mirror          Ther Activity 8/2 8/7 8/9 8/16                                   Gait Training 8/2 8/9 8/16                                   Modalities 8/2 8/9 8/16

## 2023-08-23 ENCOUNTER — APPOINTMENT (OUTPATIENT)
Dept: PHYSICAL THERAPY | Facility: CLINIC | Age: 48
End: 2023-08-23
Payer: COMMERCIAL

## 2023-09-28 ENCOUNTER — TELEPHONE (OUTPATIENT)
Dept: INTERNAL MEDICINE CLINIC | Facility: OTHER | Age: 48
End: 2023-09-28

## 2023-09-28 NOTE — TELEPHONE ENCOUNTER
Hi, my name is Steve Chambers and I'm calling from [address] [place] [address] and I'm following up on a medical record request and a disability form. The patient's name is Celio Merrill. YOB: 1975 and the providers name is Doctor Michael Virk. So we have sent the request a couple of times and the recent one was sent on September 22nd, 2023 and it was sent to the fax number which is [phone] and we need the medical records from 2/18/23 to present in a disability form to be completed by the provider in order to process the patient's disability claim. For further inquiries, please call us at [phone] and when you call us back, please refer to the incident number, which is [id number]. Records in forms can be faxed to us at [phone]. Once again, my name is Steve Chambers and I'm calling from [address] [place] [address]. Thank you and have a great day.

## 2023-09-29 NOTE — TELEPHONE ENCOUNTER
Spoke with patient about these forms because Dr. Tito Abbott stated he would need an appointment first prior to filling out forms. Patient stated that his disability was already approved and he would look into it and give us a call back if he needed to have it completed. This was over a week ago and patient never called back.

## 2023-11-24 ENCOUNTER — HOSPITAL ENCOUNTER (EMERGENCY)
Facility: HOSPITAL | Age: 48
Discharge: HOME | End: 2023-11-24
Attending: EMERGENCY MEDICINE
Payer: COMMERCIAL

## 2023-11-24 VITALS
WEIGHT: 253 LBS | BODY MASS INDEX: 35.29 KG/M2 | SYSTOLIC BLOOD PRESSURE: 166 MMHG | HEART RATE: 99 BPM | RESPIRATION RATE: 18 BRPM | OXYGEN SATURATION: 97 % | DIASTOLIC BLOOD PRESSURE: 109 MMHG | TEMPERATURE: 97.7 F

## 2023-11-24 DIAGNOSIS — Z76.0 MEDICATION REFILL: Primary | ICD-10-CM

## 2023-11-24 PROCEDURE — 99284 EMERGENCY DEPT VISIT MOD MDM: CPT | Performed by: EMERGENCY MEDICINE

## 2023-11-24 PROCEDURE — 99281 EMR DPT VST MAYX REQ PHY/QHP: CPT

## 2023-11-24 RX ORDER — CLONIDINE HYDROCHLORIDE 0.1 MG/1
0.2 TABLET ORAL ONCE
Status: COMPLETED | OUTPATIENT
Start: 2023-11-24 | End: 2023-11-24

## 2023-11-24 RX ORDER — METHADONE HYDROCHLORIDE 10 MG/ML
30 CONCENTRATE ORAL ONCE
Status: COMPLETED | OUTPATIENT
Start: 2023-11-24 | End: 2023-11-24

## 2023-11-24 RX ADMIN — METHADONE HYDROCHLORIDE 30 MG: 10 CONCENTRATE ORAL at 13:25

## 2023-11-24 RX ADMIN — CLONIDINE HYDROCHLORIDE 0.2 MG: 0.1 TABLET ORAL at 12:52

## 2023-11-24 NOTE — ED PROVIDER NOTES
History  Chief Complaint   Patient presents with    Medication Administration Only     Pt reports he would like a dose of methadone, reports he is currently being seen by [facility] but could not get there this morning due to them closing early for the holiday. Patient is a 41-year-old male who presents requesting a dose of methadone. States missed his dose at the [facility] today. Does not know dose. Did not bring any proof of dosing. None       Past Medical History:   Diagnosis Date    Full dentures     upper    History of kidney stones     History of transfusion 1983    No Reaction    Left ankle pain     SUSAN (obstructive sleep apnea)     No CPAP    Subdural hematoma (HCC)     Wears glasses     readers       Past Surgical History:   Procedure Laterality Date    BRAIN HEMATOMA EVACUATION      age 8--"after a fall while playing hockey"    ORIF ELBOW FRACTURE Left     [place] OPEN TREATMENT BIMALLEOLAR ANKLE FRACTURE Left 03/08/2023    Procedure: BIMALLEOLAR ORIF OF ANKLE;  Surgeon: Kevin Remy DPM;  Location: [facility] MAIN OR;  Service: Podiatry    [place] OPEN [facility] Left 5/10/2023    Procedure: REMOVAL HARDWARE FROM ANKLE, REPAIR SYNDESMOSIS DELTOIDS WITH HARDWARE AS NEEDED;  Surgeon: Kevin Remy DPM;  Location: [facility] MAIN OR;  Service: Podiatry       Family History   Problem Relation Age of Onset    No Known Problems Mother     No Known Problems Father      I have reviewed and agree with the history as documented.     E-Cigarette/Vaping    E-Cigarette Use Never User      E-Cigarette/Vaping Substances     Social History     Tobacco Use    Smoking status: Every Day     Packs/day: 0.01     Years: 20.00     Total pack years: 0.20     Types: Cigarettes     Start date: 2003    Smokeless tobacco: Never    Tobacco comments:     1 a day--per pt tried patch and trying to quit on own   Vaping Use    Vaping Use: Never used   Substance Use Topics    Alcohol use: Not Currently Comment: No Alcohol for 5 years( 2/25/2018)    Drug use: Not Currently     Types: Marijuana     Comment: 5/8/23 pt "denies any use of illicit drugs or narcotics-- only marijuana in the past"       Review of Systems   Constitutional: Negative. HENT: Negative. Eyes: Negative. Respiratory: Negative. Cardiovascular: Negative. Gastrointestinal: Negative. Endocrine: Negative. Genitourinary: Negative. Musculoskeletal: Negative. Skin: Negative. Allergic/Immunologic: Negative. Neurological: Negative. Hematological: Negative. Psychiatric/Behavioral: Negative. All other systems reviewed and are negative. Physical Exam  Physical Exam  Vitals and nursing note reviewed. Constitutional:       Appearance: Normal appearance. He is obese. HENT:      Head: Normocephalic and atraumatic. Cardiovascular:      Rate and Rhythm: Normal rate and regular rhythm. Pulses: Normal pulses. Heart sounds: Normal heart sounds. Pulmonary:      Effort: Pulmonary effort is normal.      Breath sounds: Normal breath sounds. Musculoskeletal:         General: Normal range of motion. Cervical back: Normal range of motion and neck supple. Skin:     General: Skin is warm and dry. Capillary Refill: Capillary refill takes less than 2 seconds. Neurological:      General: No focal deficit present. Mental Status: He is alert and oriented to person, place, and time.    Psychiatric:         Mood and Affect: Mood normal.         Behavior: Behavior normal.         Vital Signs  ED Triage Vitals   Temperature Pulse Respirations Blood Pressure SpO2   11/24/23 1232 11/24/23 1232 11/24/23 1232 11/24/23 1232 11/24/23 1232   97.7 °F (36.5 °C) 99 18 (!) 166/109 97 %      Temp Source Heart Rate Source Patient Position - Orthostatic VS BP Location FiO2 (%)   11/24/23 1232 11/24/23 1232 11/24/23 1232 11/24/23 1232 --   Tympanic Monitor Sitting Left arm       Pain Score       11/24/23 1325 Med Not Given for Pain - for MAR use only           Vitals:    11/24/23 1232   BP: (!) 166/109   Pulse: 99   Patient Position - Orthostatic VS: Sitting         Visual Acuity      ED Medications  Medications   cloNIDine (CATAPRES) tablet 0.2 mg (0.2 mg Oral Given 11/24/23 1252)   methadone (DOLOPHINE) oral concentrated solution 30 mg (30 mg Oral Given 11/24/23 1325)       Diagnostic Studies  Results Reviewed       None                   No orders to display              Procedures  Procedures         ED Course  ED Course as of 11/24/23 1326   Fri Nov 24, 2023   1242 Patient provided 3 phone numbers to me,  [phone], after hours [phone], and his [phone]. No answer from any of these lines despite several attempts. Only now starting to feel withdrawal, will give clonidine here. [phone] with patient. Unable to verify dosing. Advised patient to return if he has documentation but next time he's at the clinic, get some sort of paperwork documenting dose for any future issues. 1302 Patient states was able to get a response from after hours number. Message left. [address] from [facility] called back. Confirmed dose of 30 mg. SBIRT 22yo+      Flowsheet Row Most Recent Value   Initial Alcohol Screen: US AUDIT-C     1. How often do you have a drink containing alcohol? 0 Filed at: 11/24/2023 1234   2. How many drinks containing alcohol do you have on a typical day you are drinking? 0 Filed at: 11/24/2023 1234   3a. Male UNDER 65: How often do you have five or more drinks on one occasion? 0 Filed at: 11/24/2023 1234   3b. FEMALE Any Age, or MALE 65+: How often do you have 4 or more drinks on one occassion? 0 Filed at: 11/24/2023 1234   Audit-C Score 0 Filed at: 11/24/2023 1234   DANAY: How many times in the past year have you. .. Used an illegal drug or used a prescription medication for non-medical reasons?  Never Filed at: 11/24/2023 1234 Medical Decision Making  Problems Addressed:  Medication refill: acute illness or injury     Details: eventually able to verify dose from [facility]. Amount and/or Complexity of Data Reviewed  Independent Historian:      Details: [facility]    Risk  Prescription drug management. Disposition  Final diagnoses:   Medication refill     Time reflects when diagnosis was documented in both MDM as applicable and the Disposition within this note       Time User Action Codes Description Comment    11/24/2023 12:48 PM Tomás Larkin Zeny [Z76.0] Medication refill           ED Disposition       ED Disposition   Discharge    Condition   Stable    Date/Time   Fri Nov 24, 2023 9801 [facility] discharge to home/self care. Follow-up Information       Follow up With Specialties Details Why Contact Info    Michael Virk MD Internal Medicine   [place] [address] [address] [facility]  [phone]              Patient's Medications    No medications on file       No discharge procedures on file.     PDMP Review         Value Time User    PDMP Reviewed  Yes 2/18/2023 11:51 PM Emilee Roberson PA-C            ED Provider  Electronically Signed by             Fae Leyden, MD  11/24/23 1601

## 2025-04-03 ENCOUNTER — TELEPHONE (OUTPATIENT)
Age: 50
End: 2025-04-03

## 2025-04-03 NOTE — TELEPHONE ENCOUNTER
Left message on machine for patient to call office back to schedule annual physical and overdue follow up. Patient las seen on 5/4/23.

## (undated) DEVICE — CHLORAPREP HI-LITE 26ML ORANGE

## (undated) DEVICE — KERLIX BANDAGE ROLL: Brand: KERLIX

## (undated) DEVICE — OCCLUSIVE GAUZE STRIP,3% BISMUTH TRIBROMOPHENATE IN PETROLATUM BLEND: Brand: XEROFORM

## (undated) DEVICE — SUT VICRYL 3-0 SH 27 IN J416H

## (undated) DEVICE — STOCKINETTE 2P PREROLLD 6X60

## (undated) DEVICE — DISPOSABLE OR TOWEL: Brand: CARDINAL HEALTH

## (undated) DEVICE — SINGLE PORT MANIFOLD: Brand: NEPTUNE 2

## (undated) DEVICE — PENCIL ELECTROSURG E-Z CLEAN -0035H

## (undated) DEVICE — SUT ETHILON 3-0 PS-1 18 IN 1663H

## (undated) DEVICE — DRAPE C-ARM X-RAY

## (undated) DEVICE — INTENDED FOR TISSUE SEPARATION, AND OTHER PROCEDURES THAT REQUIRE A SHARP SURGICAL BLADE TO PUNCTURE OR CUT.: Brand: BARD-PARKER ® SAFETYLOCK CARBON RIB-BACK BLADES

## (undated) DEVICE — CURITY NON-ADHERENT STRIPS: Brand: CURITY

## (undated) DEVICE — CUFF TOURNIQUET 18 X 4 IN QUICK CONNECT DISP 1 BLADDER

## (undated) DEVICE — DRAPE C-ARMOUR

## (undated) DEVICE — WEBRIL SYNTHETIC 6INX4YD

## (undated) DEVICE — CUFF TOURNIQUET 30 X 4 IN QUICK CONNECT DISP 1BLA

## (undated) DEVICE — SCD SEQUENTIAL COMPRESSION COMFORT SLEEVE MEDIUM KNEE LENGTH: Brand: KENDALL SCD

## (undated) DEVICE — COBAN 4 IN STERILE

## (undated) DEVICE — DRILL BIT, AO DIA2.6MM X 135MM, SCALED: Brand: VARIAX

## (undated) DEVICE — GLOVE SRG LF STRL BGL SKNSNS 7 PF

## (undated) DEVICE — ACE WRAP 4 IN UNSTERILE

## (undated) DEVICE — SYRINGE 10ML LL

## (undated) DEVICE — SUT ETHILON 3-0 PS-1 18 IN 1663G

## (undated) DEVICE — WEBRIL SYNTHETIC 4INX4YD

## (undated) DEVICE — DRILL BIT

## (undated) DEVICE — Device

## (undated) DEVICE — CAST PADDING 6 IN SYNTHETIC STRL

## (undated) DEVICE — PAD CAST 4 IN COTTON NON STERILE

## (undated) DEVICE — ACE WRAP 6 IN UNSTERILE

## (undated) DEVICE — BETHLEHEM UNIVERSAL  MIONR EXT: Brand: CARDINAL HEALTH

## (undated) DEVICE — STANDARD SURGICAL GOWN, L: Brand: CONVERTORS

## (undated) DEVICE — GAUZE SPONGES,16 PLY: Brand: CURITY

## (undated) DEVICE — SPLINT 4 IN X 15 FT DYNACAST S

## (undated) DEVICE — COUNTERSINK

## (undated) DEVICE — CAST PADDING 4 IN SYNTHETIC NON-STRL

## (undated) DEVICE — NEEDLE BLUNT 18 G X 1 1/2IN

## (undated) DEVICE — NEEDLE 25G X 1 1/2

## (undated) DEVICE — STERILE POLYISOPRENE POWDER-FREE SURGICAL GLOVES WITH EMOLLIENT COATING: Brand: PROTEXIS

## (undated) DEVICE — BUCKET PLASTER DISPOSABLE

## (undated) DEVICE — CABLE BIPOLAR DISP MEGADYNE

## (undated) DEVICE — PAD CAST 6 IN COTTON NON STERILE